# Patient Record
Sex: MALE | Race: OTHER | NOT HISPANIC OR LATINO
[De-identification: names, ages, dates, MRNs, and addresses within clinical notes are randomized per-mention and may not be internally consistent; named-entity substitution may affect disease eponyms.]

---

## 2018-03-19 ENCOUNTER — APPOINTMENT (OUTPATIENT)
Dept: PEDIATRIC ALLERGY IMMUNOLOGY | Facility: CLINIC | Age: 38
End: 2018-03-19

## 2019-05-01 ENCOUNTER — APPOINTMENT (OUTPATIENT)
Dept: FAMILY MEDICINE | Facility: CLINIC | Age: 39
End: 2019-05-01
Payer: COMMERCIAL

## 2019-05-01 VITALS
OXYGEN SATURATION: 94 % | RESPIRATION RATE: 16 BRPM | SYSTOLIC BLOOD PRESSURE: 126 MMHG | DIASTOLIC BLOOD PRESSURE: 74 MMHG | HEIGHT: 68 IN | HEART RATE: 66 BPM | WEIGHT: 240 LBS | BODY MASS INDEX: 36.37 KG/M2

## 2019-05-01 PROCEDURE — 99203 OFFICE O/P NEW LOW 30 MIN: CPT

## 2019-05-01 NOTE — HEALTH RISK ASSESSMENT
[] : No [No falls in past year] : Patient reported no falls in the past year [0] : 2) Feeling down, depressed, or hopeless: Not at all (0) [[id number]] : 0

## 2019-05-01 NOTE — ASSESSMENT
[FreeTextEntry1] : already taking flonase, singulair \par unsure of etiology of ear pain\par \par already had blood work done

## 2019-05-01 NOTE — PHYSICAL EXAM
[Well Nourished] : well nourished [Clear to Auscultation] : lungs were clear to auscultation bilaterally [Regular Rhythm] : with a regular rhythm [Normal S1, S2] : normal S1 and S2 [Normal Gait] : normal gait [Normal Insight/Judgement] : insight and judgment were intact [de-identified] : ears clear

## 2019-05-01 NOTE — HISTORY OF PRESENT ILLNESS
[FreeTextEntry8] : 39 year old male here to establish care and with complaints of left ear feeling clogged. Patients active medications, allergies and issues were all reviewed with the patient at time of visit.\par

## 2019-05-17 ENCOUNTER — APPOINTMENT (OUTPATIENT)
Dept: FAMILY MEDICINE | Facility: CLINIC | Age: 39
End: 2019-05-17
Payer: COMMERCIAL

## 2019-05-17 VITALS
BODY MASS INDEX: 36.37 KG/M2 | WEIGHT: 240 LBS | HEIGHT: 68 IN | DIASTOLIC BLOOD PRESSURE: 80 MMHG | SYSTOLIC BLOOD PRESSURE: 122 MMHG

## 2019-05-17 PROCEDURE — 36415 COLL VENOUS BLD VENIPUNCTURE: CPT

## 2019-05-17 PROCEDURE — 99395 PREV VISIT EST AGE 18-39: CPT | Mod: 25

## 2019-05-17 NOTE — HEALTH RISK ASSESSMENT
[Good] : ~his/her~ current health as good [No falls in past year] : Patient reported no falls in the past year [0] : 1) Little interest or pleasure doing things: Not at all (0) [None] : None [# Of Children ___] : has [unfilled] children [Fully functional (bathing, dressing, toileting, transferring, walking, feeding)] : Fully functional (bathing, dressing, toileting, transferring, walking, feeding) [Smoke Detector] : smoke detector [Fully functional (using the telephone, shopping, preparing meals, housekeeping, doing laundry, using] : Fully functional and needs no help or supervision to perform IADLs (using the telephone, shopping, preparing meals, housekeeping, doing laundry, using transportation, managing medications and managing finances) [Seat Belt] :  uses seat belt [[id number]] : 0 [] : No [de-identified] : fivelma

## 2019-05-17 NOTE — HISTORY OF PRESENT ILLNESS
[FreeTextEntry8] : 39 year old male  here for annual well visit. Patient's blood work was drawn and medications reviewed. Patient's past medical history was reviewed, allergies verified and problems were identified and assessed. Patients medications were reviewed. Patient is feeling well with no new or active complaints at this time.\par \par wants to discuss weight loss \par \par

## 2019-05-17 NOTE — PHYSICAL EXAM
[Clear to Auscultation] : lungs were clear to auscultation bilaterally [Well Nourished] : well nourished [Regular Rhythm] : with a regular rhythm [Normal Gait] : normal gait [Normal S1, S2] : normal S1 and S2 [Normal Insight/Judgement] : insight and judgment were intact

## 2019-05-17 NOTE — ASSESSMENT
[FreeTextEntry1] : Patient was counseled on healthy eating habits, on daily exercise and stress relief. All medications and allergies were reviewed with the patient and any adjustments necessary were made. Patient was counseled to try engage in an exercise activity for at least 30 minutes 3-4 times a week.  Patient was advised to eat a diet low in fat and carbohydrates and high in protein, with plenty of vegetables. Patient was advised to try and engage in relaxing activities whenever possible.\par The patients blood was draw in office and will be followed and assessed for any issues.  Patient was told to return to the office if any issues arise.  Unless otherwise stated, the patient is to continue all other medications as previously prescribed.\par \par try singulair\par \par The diagnosis of obesity was discussed with the patient. The patient was counseled on diet and exercise. Patient was advised to eat a diet low in carbohydrates and low in fat with high protein diet with plenty of vegetables. Patient was counseled to eat small meals throughout the day avoiding carbohydrates, foods high in fat, foods that are fried and fast food.  Patient was advised to monitor fluid intake, to drink at least 8 glasses of pure water a day and reduce/stop intake of all sugar drinks including juice and soda. Patient was advised to try and exercise for 30-40 minutes per day for at least three days a week. Pros and cons of using medical management for weight loss versus diet/exercise alone was discussed. Medication options were provided for the patient and side affects and risks were identified and discussed.  Patient was advised to take any medications as prescribed and to call office or go to the ER immediately if any issues with the medications occur. All questions were answered.\par will start phentermine

## 2019-06-18 ENCOUNTER — RX RENEWAL (OUTPATIENT)
Age: 39
End: 2019-06-18

## 2019-06-21 ENCOUNTER — APPOINTMENT (OUTPATIENT)
Dept: FAMILY MEDICINE | Facility: CLINIC | Age: 39
End: 2019-06-21
Payer: COMMERCIAL

## 2019-06-21 VITALS
HEART RATE: 69 BPM | WEIGHT: 234 LBS | RESPIRATION RATE: 16 BRPM | HEIGHT: 68 IN | BODY MASS INDEX: 35.46 KG/M2 | SYSTOLIC BLOOD PRESSURE: 112 MMHG | OXYGEN SATURATION: 96 % | DIASTOLIC BLOOD PRESSURE: 60 MMHG

## 2019-06-21 PROCEDURE — 99214 OFFICE O/P EST MOD 30 MIN: CPT

## 2019-06-21 NOTE — HISTORY OF PRESENT ILLNESS
[FreeTextEntry8] : 39 year old male is here for a followup visit for obesity. Patient is here for medication renewals and for blood work discussion. Medications and allergies were reviewed and assessed.  There has been no new medications since the last visit. Patient is feeling well with no active changes or issues since His last visit.\par \par \par \par

## 2019-06-21 NOTE — ASSESSMENT
[FreeTextEntry1] : OBESITY\par The diagnosis of obesity was discussed with the patient. The patient was counseled on diet and exercise. Patient was advised to eat a diet low in carbohydrates and low in fat with high protein diet with plenty of vegetables. Patient was counseled to eat small meals throughout the day avoiding carbohydrates, foods high in fat, foods that are fried and fast food.  Patient was advised to monitor fluid intake, to drink at least 8 glasses of pure water a day and reduce/stop intake of all sugar drinks including juice and soda. Patient was advised to try and exercise for 30-40 minutes per day for at least three days a week. Pros and cons of using medical management for weight loss versus diet/exercise alone was discussed. Medication options were provided for the patient and side affects and risks were identified and discussed.  Patient was advised to take any medications as prescribed and to call office or go to the ER immediately if any issues with the medications occur. All questions were answered.\par lost 6 pounds, feeling great\par bond, eating less, smaller portions\par \par ALLERGIES\par doing better overall with flonase and singulair\par continue

## 2019-06-21 NOTE — HEALTH RISK ASSESSMENT
[] : No [No falls in past year] : Patient reported no falls in the past year [0] : 2) Feeling down, depressed, or hopeless: Not at all (0) [[id number]] : 0 [Good] : ~his/her~  mood as  good [None] : None [# Of Children ___] : has [unfilled] children [Fully functional (bathing, dressing, toileting, transferring, walking, feeding)] : Fully functional (bathing, dressing, toileting, transferring, walking, feeding) [Fully functional (using the telephone, shopping, preparing meals, housekeeping, doing laundry, using] : Fully functional and needs no help or supervision to perform IADLs (using the telephone, shopping, preparing meals, housekeeping, doing laundry, using transportation, managing medications and managing finances) [Smoke Detector] : smoke detector [Seat Belt] :  uses seat belt [de-identified] : fivelma

## 2019-07-10 ENCOUNTER — APPOINTMENT (OUTPATIENT)
Dept: FAMILY MEDICINE | Facility: CLINIC | Age: 39
End: 2019-07-10
Payer: COMMERCIAL

## 2019-07-10 VITALS
RESPIRATION RATE: 16 BRPM | HEART RATE: 99 BPM | SYSTOLIC BLOOD PRESSURE: 140 MMHG | BODY MASS INDEX: 35.46 KG/M2 | OXYGEN SATURATION: 95 % | DIASTOLIC BLOOD PRESSURE: 70 MMHG | WEIGHT: 234 LBS | HEIGHT: 68 IN | TEMPERATURE: 101.7 F

## 2019-07-10 LAB
FLUAV SPEC QL CULT: POSITIVE
FLUBV AG SPEC QL IA: NEGATIVE

## 2019-07-10 PROCEDURE — 99213 OFFICE O/P EST LOW 20 MIN: CPT | Mod: 25

## 2019-07-10 PROCEDURE — 87804 INFLUENZA ASSAY W/OPTIC: CPT | Mod: QW

## 2019-07-10 RX ORDER — NAPROXEN 500 MG/1
500 TABLET ORAL
Qty: 20 | Refills: 0 | Status: COMPLETED | COMMUNITY
Start: 2019-03-25

## 2019-07-10 NOTE — PHYSICAL EXAM
[Well Nourished] : well nourished [Clear to Auscultation] : lungs were clear to auscultation bilaterally [Regular Rhythm] : with a regular rhythm [Normal Gait] : normal gait [Normal S1, S2] : normal S1 and S2 [Normal Insight/Judgement] : insight and judgment were intact [de-identified] : right ear red

## 2019-07-10 NOTE — HISTORY OF PRESENT ILLNESS
[FreeTextEntry8] : 39 year old male here with complaints of body aches, chills, fever, cough and congestion for one day. Patients active medications, allergies and issues were all reviewed with the patient at time of visit.\par \par \par \par

## 2019-07-10 NOTE — ASSESSMENT
[FreeTextEntry1] : Rapid flu test was administered for both Flu A and Flu B due to symptoms consistent with the flu.  Patient symptoms are due to a virus and is advised to rest, stay home, hydrate, avoid public places, use good handwashing hygiene.   May use tylenol/ibuprofen as necessary for symptoms.  If symptoms worsen or do not improve return to this office, seek care or go directly to the ER.\par Test results were: flu A test POSITIVE\par \par The symptoms that are occurring are most likely secondary to virus, therefore antibiotics are deferred at this time. Patient was told to rest, hydrate and treat symptoms as necessary. May use tylenol/ibuprofen as necessary for symptomatic relief.  If symptoms worsen or do not improve return to this office, seek care or go directly to the ER.\par TAMIFLU

## 2019-07-10 NOTE — HEALTH RISK ASSESSMENT
[No falls in past year] : Patient reported no falls in the past year [0] : 2) Feeling down, depressed, or hopeless: Not at all (0) [Good] : ~his/her~  mood as  good [None] : None [# Of Children ___] : has [unfilled] children [Fully functional (bathing, dressing, toileting, transferring, walking, feeding)] : Fully functional (bathing, dressing, toileting, transferring, walking, feeding) [Fully functional (using the telephone, shopping, preparing meals, housekeeping, doing laundry, using] : Fully functional and needs no help or supervision to perform IADLs (using the telephone, shopping, preparing meals, housekeeping, doing laundry, using transportation, managing medications and managing finances) [Smoke Detector] : smoke detector [Seat Belt] :  uses seat belt [] : No [[id number]] : 0 [de-identified] : fivelma

## 2019-07-15 ENCOUNTER — APPOINTMENT (OUTPATIENT)
Dept: FAMILY MEDICINE | Facility: CLINIC | Age: 39
End: 2019-07-15
Payer: COMMERCIAL

## 2019-07-15 VITALS — DIASTOLIC BLOOD PRESSURE: 88 MMHG | SYSTOLIC BLOOD PRESSURE: 130 MMHG

## 2019-07-15 PROCEDURE — 99213 OFFICE O/P EST LOW 20 MIN: CPT

## 2019-07-15 NOTE — HISTORY OF PRESENT ILLNESS
[FreeTextEntry8] : 39 year old male here for fu for flu, however not feeling well.  Patients active medications, allergies and issues were all reviewed with the patient at time of visit.\par \par \par \par

## 2019-07-15 NOTE — ASSESSMENT
[FreeTextEntry1] : Rapid flu test was administered for both Flu A and Flu B due to symptoms consistent with the flu.  Patient symptoms are due to a virus and is advised to rest, stay home, hydrate, avoid public places, use good handwashing hygiene.   May use tylenol/ibuprofen as necessary for symptoms.  If symptoms worsen or do not improve return to this office, seek care or go directly to the ER.\par Test results were: flu A test POSITIVE on 7/10/19\par \par now with rhonchi\par treat for pneumonia\par prednisone\par ventolin\par \par Patient was advised to take all medications as prescribed and to finish any antibiotics in their entirety. Patient was told to rest, hydrate and treat symptoms as necessary. Patient was advised to return to this office or go directly to the ER if symptoms do not improve or if any worsening occurs.\par

## 2019-07-15 NOTE — PHYSICAL EXAM
[Well Nourished] : well nourished [Regular Rhythm] : with a regular rhythm [Normal S1, S2] : normal S1 and S2 [Normal Gait] : normal gait [Normal Insight/Judgement] : insight and judgment were intact [de-identified] : right ear red [de-identified] : [place]

## 2019-07-15 NOTE — HEALTH RISK ASSESSMENT
[] : No [No falls in past year] : Patient reported no falls in the past year [0] : 2) Feeling down, depressed, or hopeless: Not at all (0) [[id number]] : 0 [Good] : ~his/her~  mood as  good [None] : None [# Of Children ___] : has [unfilled] children [Fully functional (bathing, dressing, toileting, transferring, walking, feeding)] : Fully functional (bathing, dressing, toileting, transferring, walking, feeding) [Fully functional (using the telephone, shopping, preparing meals, housekeeping, doing laundry, using] : Fully functional and needs no help or supervision to perform IADLs (using the telephone, shopping, preparing meals, housekeeping, doing laundry, using transportation, managing medications and managing finances) [Smoke Detector] : smoke detector [Seat Belt] :  uses seat belt [de-identified] : fivelma

## 2019-07-31 ENCOUNTER — APPOINTMENT (OUTPATIENT)
Dept: FAMILY MEDICINE | Facility: CLINIC | Age: 39
End: 2019-07-31

## 2019-08-12 ENCOUNTER — APPOINTMENT (OUTPATIENT)
Dept: FAMILY MEDICINE | Facility: CLINIC | Age: 39
End: 2019-08-12
Payer: COMMERCIAL

## 2019-08-12 VITALS
DIASTOLIC BLOOD PRESSURE: 82 MMHG | WEIGHT: 233 LBS | HEIGHT: 68 IN | SYSTOLIC BLOOD PRESSURE: 130 MMHG | BODY MASS INDEX: 35.31 KG/M2

## 2019-08-12 PROCEDURE — 99214 OFFICE O/P EST MOD 30 MIN: CPT

## 2019-08-12 RX ORDER — PROMETHAZINE HYDROCHLORIDE AND DEXTROMETHORPHAN HYDROBROMIDE ORAL SOLUTION 15; 6.25 MG/5ML; MG/5ML
6.25-15 SOLUTION ORAL
Qty: 150 | Refills: 0 | Status: DISCONTINUED | COMMUNITY
Start: 2019-07-10 | End: 2019-08-12

## 2019-08-12 RX ORDER — PREDNISONE 20 MG/1
20 TABLET ORAL
Qty: 5 | Refills: 0 | Status: DISCONTINUED | COMMUNITY
Start: 2019-07-15 | End: 2019-08-12

## 2019-08-12 RX ORDER — LEVOFLOXACIN 500 MG/1
500 TABLET, FILM COATED ORAL DAILY
Qty: 10 | Refills: 0 | Status: DISCONTINUED | COMMUNITY
Start: 2019-07-15 | End: 2019-08-12

## 2019-08-12 RX ORDER — IBUPROFEN 800 MG/1
800 TABLET, FILM COATED ORAL
Qty: 21 | Refills: 0 | Status: DISCONTINUED | COMMUNITY
Start: 2019-07-10 | End: 2019-08-12

## 2019-08-12 RX ORDER — OSELTAMIVIR PHOSPHATE 75 MG/1
75 CAPSULE ORAL TWICE DAILY
Qty: 10 | Refills: 0 | Status: DISCONTINUED | COMMUNITY
Start: 2019-07-10 | End: 2019-08-12

## 2019-08-12 NOTE — HEALTH RISK ASSESSMENT
[Yes] : Yes [] : No [No falls in past year] : Patient reported no falls in the past year [0] : 2) Feeling down, depressed, or hopeless: Not at all (0) [[id number]] : 0 [Good] : ~his/her~  mood as  good [# Of Children ___] : has [unfilled] children [None] : None [Fully functional (using the telephone, shopping, preparing meals, housekeeping, doing laundry, using] : Fully functional and needs no help or supervision to perform IADLs (using the telephone, shopping, preparing meals, housekeeping, doing laundry, using transportation, managing medications and managing finances) [Fully functional (bathing, dressing, toileting, transferring, walking, feeding)] : Fully functional (bathing, dressing, toileting, transferring, walking, feeding) [Smoke Detector] : smoke detector [Seat Belt] :  uses seat belt [de-identified] : fivelma

## 2019-08-12 NOTE — ASSESSMENT
[FreeTextEntry1] : OBESITY\par The diagnosis of obesity was discussed with the patient. The patient was counseled on diet and exercise. Patient was advised to eat a diet low in carbohydrates and low in fat with high protein diet with plenty of vegetables. Patient was counseled to eat small meals throughout the day avoiding carbohydrates, foods high in fat, foods that are fried and fast food.  Patient was advised to monitor fluid intake, to drink at least 8 glasses of pure water a day and reduce/stop intake of all sugar drinks including juice and soda. Patient was advised to try and exercise for 30-40 minutes per day for at least three days a week. Pros and cons of using medical management for weight loss versus diet/exercise alone was discussed. Medication options were provided for the patient and side affects and risks were identified and discussed.  Patient was advised to take any medications as prescribed and to call office or go to the ER immediately if any issues with the medications occur. All questions were answered.\par lost 7 pounds, feeling great\par bond, eating less, smaller portions\par \par ALLERGIES\par doing better overall with flonase and singulair\par continue\par fu with allergist\par \par + FLU/URI\par feeling better\par just return back to work

## 2019-09-01 ENCOUNTER — TRANSCRIPTION ENCOUNTER (OUTPATIENT)
Age: 39
End: 2019-09-01

## 2019-09-05 ENCOUNTER — APPOINTMENT (OUTPATIENT)
Dept: OTOLARYNGOLOGY | Facility: CLINIC | Age: 39
End: 2019-09-05
Payer: COMMERCIAL

## 2019-09-05 VITALS
BODY MASS INDEX: 34.86 KG/M2 | DIASTOLIC BLOOD PRESSURE: 85 MMHG | WEIGHT: 230 LBS | SYSTOLIC BLOOD PRESSURE: 128 MMHG | HEIGHT: 68 IN | HEART RATE: 61 BPM

## 2019-09-05 DIAGNOSIS — Z78.9 OTHER SPECIFIED HEALTH STATUS: ICD-10-CM

## 2019-09-05 PROCEDURE — 31231 NASAL ENDOSCOPY DX: CPT

## 2019-09-05 PROCEDURE — 99204 OFFICE O/P NEW MOD 45 MIN: CPT | Mod: 25

## 2019-09-05 PROCEDURE — 92567 TYMPANOMETRY: CPT

## 2019-09-05 PROCEDURE — 92557 COMPREHENSIVE HEARING TEST: CPT

## 2019-09-05 NOTE — REASON FOR VISIT
[Initial Evaluation] : an initial evaluation for [Other: _____] : [unfilled] [FreeTextEntry2] : Initial visit for recent episode of dizziness and syncopal type event on Saturday 8/31/19, feelings of lightheadedness and losing of peripheral vision.

## 2019-09-05 NOTE — REVIEW OF SYSTEMS
[As Noted in HPI] : as noted in HPI [Ear Pain] : ear pain [Ear Itch] : ear itch [Lightheadedness] : lightheadedness [Recurrent Sinus Infections] : recurrent sinus infections [Eyesight Problems] : eyesight problems [Throat Clearing] : throat clearing [Joint Pain] : joint pain [Negative] : Heme/Lymph [FreeTextEntry1] : headache

## 2019-09-05 NOTE — HISTORY OF PRESENT ILLNESS
[de-identified] : 39 year old male initial visit for recent episode of dizziness and syncopal type event on Saturday 8/31/19, feelings of lightheadedness, nausea and losing of peripheral vision.  Patient states went to urgent care and blood sugar was 67, blood pressure and EKG done, normal, prescribed meclizine for dizziness.  Reports similar episode many years ago, but has not had additional episode since most recent event.  Patient is a  for [employer], currently not working, pending appointment with Neurologist and Cardiologist.  Patient states no longer feeling dizziness, intermittent nausea and vision is normal.  States ears feeling clogged, intermittent otalgia, secondary to TMJ history on Left side.  Denies otorrhea, tinnitus, hearing loss and ear infections in the past year.

## 2019-09-05 NOTE — PHYSICAL EXAM
[Normal] : no rashes [de-identified] : No spontaneous or gaze evoked nystagmus.  Head impulse testing normal.  [place] hallpike and supine roll maneuver normal.  Romberg negative for increased sway with eyes closed compared to open.

## 2019-09-06 ENCOUNTER — APPOINTMENT (OUTPATIENT)
Dept: FAMILY MEDICINE | Facility: CLINIC | Age: 39
End: 2019-09-06
Payer: COMMERCIAL

## 2019-09-06 VITALS — DIASTOLIC BLOOD PRESSURE: 80 MMHG | SYSTOLIC BLOOD PRESSURE: 130 MMHG

## 2019-09-06 DIAGNOSIS — Z87.09 PERSONAL HISTORY OF OTHER DISEASES OF THE RESPIRATORY SYSTEM: ICD-10-CM

## 2019-09-06 DIAGNOSIS — H93.292 OTHER ABNORMAL AUDITORY PERCEPTIONS, LEFT EAR: ICD-10-CM

## 2019-09-06 DIAGNOSIS — H92.09 OTALGIA, UNSPECIFIED EAR: ICD-10-CM

## 2019-09-06 DIAGNOSIS — J06.9 ACUTE UPPER RESPIRATORY INFECTION, UNSPECIFIED: ICD-10-CM

## 2019-09-06 PROCEDURE — 99214 OFFICE O/P EST MOD 30 MIN: CPT | Mod: 25

## 2019-09-06 PROCEDURE — 36415 COLL VENOUS BLD VENIPUNCTURE: CPT

## 2019-09-06 NOTE — HISTORY OF PRESENT ILLNESS
[FreeTextEntry8] : c/o dizziness that comes and goes w/ nausea, per pt, vertigo may have been ruled out by ent?\par states hx of heart murmur as child, c/o palpitations, would like to see cardio\par hx of asthma\par VNG pending\par mri done 3-4 yrs ago, nml per pt

## 2019-09-09 ENCOUNTER — APPOINTMENT (OUTPATIENT)
Dept: FAMILY MEDICINE | Facility: CLINIC | Age: 39
End: 2019-09-09
Payer: COMMERCIAL

## 2019-09-09 VITALS
HEART RATE: 75 BPM | TEMPERATURE: 98.4 F | HEIGHT: 68 IN | BODY MASS INDEX: 34.86 KG/M2 | DIASTOLIC BLOOD PRESSURE: 80 MMHG | OXYGEN SATURATION: 97 % | WEIGHT: 230 LBS | SYSTOLIC BLOOD PRESSURE: 120 MMHG

## 2019-09-09 DIAGNOSIS — R11.0 NAUSEA: ICD-10-CM

## 2019-09-09 PROCEDURE — 99213 OFFICE O/P EST LOW 20 MIN: CPT

## 2019-09-09 NOTE — HEALTH RISK ASSESSMENT
[] : No [Yes] : Yes [No falls in past year] : Patient reported no falls in the past year [0] : 2) Feeling down, depressed, or hopeless: Not at all (0) [[id number]] : 0 [Good] : ~his/her~  mood as  good [None] : None [Fully functional (bathing, dressing, toileting, transferring, walking, feeding)] : Fully functional (bathing, dressing, toileting, transferring, walking, feeding) [# Of Children ___] : has [unfilled] children [Fully functional (using the telephone, shopping, preparing meals, housekeeping, doing laundry, using] : Fully functional and needs no help or supervision to perform IADLs (using the telephone, shopping, preparing meals, housekeeping, doing laundry, using transportation, managing medications and managing finances) [Smoke Detector] : smoke detector [Seat Belt] :  uses seat belt [de-identified] : fivelma

## 2019-09-09 NOTE — ASSESSMENT
[FreeTextEntry1] : ALLERGIES\par doing better overall with flonase and singulair\par continue\par fu with allergist\par \par DIZZINESS\par following with ENT\par going to see cardiologist and neurologist\par describes dizziness not with changes in position, but more nausea\par zofran for nausea\par \par due to dizziness will hold off on phentermine for now\par has not taken in 10 days

## 2019-09-09 NOTE — HISTORY OF PRESENT ILLNESS
[FreeTextEntry8] : 39 year old male is here for a followup visit for obesity and with episodes of dizziness\par \par went to urgent care, was told it was vertigo\par went to ENT, said it wasn't vertigo\par \par Patient is here for medication renewals and for blood work discussion. Medications and allergies were reviewed and assessed.  There has been no new medications since the last visit. Patient is feeling well with no active changes or issues since His last visit.\par \par \par \par

## 2019-09-09 NOTE — PHYSICAL EXAM
[Well Nourished] : well nourished [Clear to Auscultation] : lungs were clear to auscultation bilaterally [Regular Rhythm] : with a regular rhythm [Normal Insight/Judgement] : insight and judgment were intact [Normal Gait] : normal gait [Normal S1, S2] : normal S1 and S2

## 2019-09-12 ENCOUNTER — APPOINTMENT (OUTPATIENT)
Dept: CARDIOLOGY | Facility: CLINIC | Age: 39
End: 2019-09-12
Payer: COMMERCIAL

## 2019-09-12 VITALS — DIASTOLIC BLOOD PRESSURE: 78 MMHG | SYSTOLIC BLOOD PRESSURE: 114 MMHG

## 2019-09-12 VITALS
OXYGEN SATURATION: 96 % | BODY MASS INDEX: 35.31 KG/M2 | SYSTOLIC BLOOD PRESSURE: 136 MMHG | DIASTOLIC BLOOD PRESSURE: 91 MMHG | HEART RATE: 71 BPM | RESPIRATION RATE: 17 BRPM | TEMPERATURE: 98.6 F | HEIGHT: 68 IN | WEIGHT: 233 LBS

## 2019-09-12 PROCEDURE — 99204 OFFICE O/P NEW MOD 45 MIN: CPT

## 2019-09-12 NOTE — DISCUSSION/SUMMARY
[FreeTextEntry1] : 39M with vague episodes of nausea, lightheadedness, headaches. Occasional dyspnea.  Does not sound cardiac in origin. May be combination of phentermine (he is currently off), dehydration, hypoglycemia. \par \par -Will check echo, EST to rule out cardiac disease\par -Continue to encourage hydration, regular meals throughout the day\par -Diet, exercise, weight loss recommended\par \par RV 3 months

## 2019-09-12 NOTE — HISTORY OF PRESENT ILLNESS
[FreeTextEntry1] : 39M who presents after urgent care visit for nausea, dizziness, headaches. Had ECG done at urgent care, was recommended to see ENT for vertigo. ENT recommended cardiac work up. At the time he went to urgent care, he denied CP, SOB. Symptoms resolved mostly but returned a week later.  Had profound nausea and lightheadedness. Subsequently resolved. Notes these episodes have happened in the past, sporadically. Seems to be happening more frequently. This time happened while watering the lawn outside. Otherwise, no clear triggers. EMT noted a blood sugar of 67 when he was having the symptoms. \par \par Notes occasional palpitations. Notes occasional chest tightness when he develops seasonal allergies. Notes a history of heart murmur since birth and had childhood echoes. Of note had been on phentermine but stopped after this most recent episode.  Symptoms have pretty much resolved at this point. \par \par Nonsmoker. Denies family hx of CAD. Works for [employer]. \par \par ECG: SR with nonspecific IVCD, LAFB

## 2019-09-12 NOTE — REVIEW OF SYSTEMS
[Fever] : no fever [Shortness Of Breath] : no shortness of breath [Chills] : no chills [Chest Pain] : no chest pain [Palpitations] : palpitations [Cough] : no cough [Abdominal Pain] : no abdominal pain [Heartburn] : no heartburn [Dizziness] : dizziness

## 2019-09-12 NOTE — PHYSICAL EXAM
[General Appearance - Well Developed] : well developed [Normal Appearance] : normal appearance [Well Groomed] : well groomed [General Appearance - Well Nourished] : well nourished [No Deformities] : no deformities [Normal Conjunctiva] : the conjunctiva exhibited no abnormalities [General Appearance - In No Acute Distress] : no acute distress [Eyelids - No Xanthelasma] : the eyelids demonstrated no xanthelasmas [Normal Oral Mucosa] : normal oral mucosa [No Oral Pallor] : no oral pallor [No Oral Cyanosis] : no oral cyanosis [Normal Jugular Venous V Waves Present] : normal jugular venous V waves present [Normal Jugular Venous A Waves Present] : normal jugular venous A waves present [Heart Rate And Rhythm] : heart rate and rhythm were normal [No Jugular Venous Huber A Waves] : no jugular venous huber A waves [Heart Sounds] : normal S1 and S2 [Murmurs] : no murmurs present [Edema] : no peripheral edema present [Exaggerated Use Of Accessory Muscles For Inspiration] : no accessory muscle use [Respiration, Rhythm And Depth] : normal respiratory rhythm and effort [Auscultation Breath Sounds / Voice Sounds] : lungs were clear to auscultation bilaterally [Abdomen Soft] : soft [Abdomen Tenderness] : non-tender [Abdomen Mass (___ Cm)] : no abdominal mass palpated [Abnormal Walk] : normal gait [Gait - Sufficient For Exercise Testing] : the gait was sufficient for exercise testing [Nail Clubbing] : no clubbing of the fingernails [Cyanosis, Localized] : no localized cyanosis [Skin Color & Pigmentation] : normal skin color and pigmentation [Petechial Hemorrhages (___cm)] : no petechial hemorrhages [No Venous Stasis] : no venous stasis [] : no rash [No Skin Ulcers] : no skin ulcer [Skin Lesions] : no skin lesions [Oriented To Time, Place, And Person] : oriented to person, place, and time [No Xanthoma] : no  xanthoma was observed [Affect] : the affect was normal [Mood] : the mood was normal [No Anxiety] : not feeling anxious

## 2019-09-17 ENCOUNTER — APPOINTMENT (OUTPATIENT)
Dept: OTOLARYNGOLOGY | Facility: CLINIC | Age: 39
End: 2019-09-17
Payer: COMMERCIAL

## 2019-09-17 PROCEDURE — 92537 CALORIC VSTBLR TEST W/REC: CPT

## 2019-09-17 PROCEDURE — 92540 BASIC VESTIBULAR EVALUATION: CPT

## 2019-09-17 PROCEDURE — 92567 TYMPANOMETRY: CPT

## 2019-09-17 PROCEDURE — 92700A: CUSTOM

## 2019-09-17 PROCEDURE — 92547 SUPPLEMENTAL ELECTRICAL TEST: CPT

## 2019-09-24 ENCOUNTER — APPOINTMENT (OUTPATIENT)
Dept: FAMILY MEDICINE | Facility: CLINIC | Age: 39
End: 2019-09-24
Payer: COMMERCIAL

## 2019-09-24 VITALS
RESPIRATION RATE: 18 BRPM | DIASTOLIC BLOOD PRESSURE: 68 MMHG | HEIGHT: 68 IN | WEIGHT: 233 LBS | BODY MASS INDEX: 35.31 KG/M2 | SYSTOLIC BLOOD PRESSURE: 116 MMHG | HEART RATE: 69 BPM | OXYGEN SATURATION: 98 %

## 2019-09-24 PROCEDURE — 99214 OFFICE O/P EST MOD 30 MIN: CPT

## 2019-09-24 RX ORDER — ONDANSETRON 4 MG/1
4 TABLET ORAL
Qty: 10 | Refills: 0 | Status: DISCONTINUED | COMMUNITY
Start: 2019-09-09 | End: 2019-09-24

## 2019-09-24 NOTE — HEALTH RISK ASSESSMENT
[] : No [No falls in past year] : Patient reported no falls in the past year [Yes] : Yes [0] : 2) Feeling down, depressed, or hopeless: Not at all (0) [[id number]] : 0 [Good] : ~his/her~  mood as  good [# Of Children ___] : has [unfilled] children [None] : None [Fully functional (using the telephone, shopping, preparing meals, housekeeping, doing laundry, using] : Fully functional and needs no help or supervision to perform IADLs (using the telephone, shopping, preparing meals, housekeeping, doing laundry, using transportation, managing medications and managing finances) [Fully functional (bathing, dressing, toileting, transferring, walking, feeding)] : Fully functional (bathing, dressing, toileting, transferring, walking, feeding) [Smoke Detector] : smoke detector [Seat Belt] :  uses seat belt [de-identified] : fivelma

## 2019-09-24 NOTE — HISTORY OF PRESENT ILLNESS
[FreeTextEntry8] : 39 year old male here for fu, Patients active medications, allergies and issues were all reviewed with the patient at time of visit.\par and with episodes of dizziness\par \par went to urgent care, was told it was vertigo\par went to ENT, said it wasn't vertigo\par had testing there and at cardio\par \par \par \par \par \par

## 2019-09-24 NOTE — PHYSICAL EXAM
[Well Nourished] : well nourished [Clear to Auscultation] : lungs were clear to auscultation bilaterally [Normal S1, S2] : normal S1 and S2 [Regular Rhythm] : with a regular rhythm [Normal Insight/Judgement] : insight and judgment were intact [Normal Gait] : normal gait

## 2019-09-26 ENCOUNTER — APPOINTMENT (OUTPATIENT)
Dept: CARDIOLOGY | Facility: CLINIC | Age: 39
End: 2019-09-26
Payer: COMMERCIAL

## 2019-09-26 VITALS
SYSTOLIC BLOOD PRESSURE: 138 MMHG | BODY MASS INDEX: 35.73 KG/M2 | HEART RATE: 68 BPM | DIASTOLIC BLOOD PRESSURE: 83 MMHG | WEIGHT: 235 LBS

## 2019-09-26 PROCEDURE — 99213 OFFICE O/P EST LOW 20 MIN: CPT

## 2019-09-26 NOTE — PHYSICAL EXAM
[General Appearance - Well Developed] : well developed [Normal Appearance] : normal appearance [Well Groomed] : well groomed [General Appearance - Well Nourished] : well nourished [No Deformities] : no deformities [General Appearance - In No Acute Distress] : no acute distress [Normal Conjunctiva] : the conjunctiva exhibited no abnormalities [Eyelids - No Xanthelasma] : the eyelids demonstrated no xanthelasmas [Normal Oral Mucosa] : normal oral mucosa [No Oral Pallor] : no oral pallor [No Oral Cyanosis] : no oral cyanosis [Normal Jugular Venous A Waves Present] : normal jugular venous A waves present [No Jugular Venous Huber A Waves] : no jugular venous huber A waves [Normal Jugular Venous V Waves Present] : normal jugular venous V waves present [Exaggerated Use Of Accessory Muscles For Inspiration] : no accessory muscle use [Respiration, Rhythm And Depth] : normal respiratory rhythm and effort [Heart Rate And Rhythm] : heart rate and rhythm were normal [Auscultation Breath Sounds / Voice Sounds] : lungs were clear to auscultation bilaterally [Heart Sounds] : normal S1 and S2 [Murmurs] : no murmurs present [Abdomen Soft] : soft [Edema] : no peripheral edema present [Abdomen Mass (___ Cm)] : no abdominal mass palpated [Abdomen Tenderness] : non-tender [Gait - Sufficient For Exercise Testing] : the gait was sufficient for exercise testing [Abnormal Walk] : normal gait [Petechial Hemorrhages (___cm)] : no petechial hemorrhages [Cyanosis, Localized] : no localized cyanosis [Nail Clubbing] : no clubbing of the fingernails [Skin Color & Pigmentation] : normal skin color and pigmentation [] : no rash [Skin Lesions] : no skin lesions [No Venous Stasis] : no venous stasis [No Skin Ulcers] : no skin ulcer [No Xanthoma] : no  xanthoma was observed [Affect] : the affect was normal [Oriented To Time, Place, And Person] : oriented to person, place, and time [No Anxiety] : not feeling anxious [Mood] : the mood was normal

## 2019-09-26 NOTE — REVIEW OF SYSTEMS
[Palpitations] : palpitations [Dizziness] : dizziness [Chills] : no chills [Fever] : no fever [Chest Pain] : no chest pain [Shortness Of Breath] : no shortness of breath [Abdominal Pain] : no abdominal pain [Cough] : no cough [Heartburn] : no heartburn

## 2019-09-26 NOTE — HISTORY OF PRESENT ILLNESS
[FreeTextEntry1] : 39M who presents for follow up for palpitations. Had been seen earlier this month for dizziness, nausea.  Was thought to be vertigo, had recent VNG which was negative for vertigo.  Notes the other day felt his heart was skipping a beat several times throughout the day. No clear triggers, sporadic. Notes he is under stress and does not sleep enough. Drinks only decaf, occasional beer drinking. Notes R sided back pain radiating around to the front, also nonexertional, without clear triggers. He has not yet had his echo or stress test, it is scheduled for next week. \par \par Early Sept 2019, urgent care visit for nausea, dizziness, headaches. Had ECG done at urgent care, was recommended to see ENT for vertigo. ENT recommended cardiac work up. At the time he went to urgent care, he denied CP, SOB. Symptoms resolved mostly but returned a week later.  Had profound nausea and lightheadedness. Subsequently resolved. Notes these episodes have happened in the past, sporadically. Seems to be happening more frequently. This time happened while watering the lawn outside. Otherwise, no clear triggers. EMT noted a blood sugar of 67 when he was having the symptoms. Of note had been on phentermine but stopped after this most recent episode. \par \par Nonsmoker. Denies family hx of CAD. Works for [employer]. \par \par ECG: SR with nonspecific IVCD, LAFB\par Lipids 5/19: 184/44/119/105

## 2019-10-03 ENCOUNTER — APPOINTMENT (OUTPATIENT)
Dept: INTERNAL MEDICINE | Facility: CLINIC | Age: 39
End: 2019-10-03
Payer: COMMERCIAL

## 2019-10-03 ENCOUNTER — APPOINTMENT (OUTPATIENT)
Dept: CARDIOLOGY | Facility: CLINIC | Age: 39
End: 2019-10-03
Payer: COMMERCIAL

## 2019-10-03 PROCEDURE — 99214 OFFICE O/P EST MOD 30 MIN: CPT | Mod: 25

## 2019-10-03 PROCEDURE — 93306 TTE W/DOPPLER COMPLETE: CPT

## 2019-10-03 PROCEDURE — 93015 CV STRESS TEST SUPVJ I&R: CPT

## 2019-11-22 ENCOUNTER — APPOINTMENT (OUTPATIENT)
Dept: NEUROLOGY | Facility: CLINIC | Age: 39
End: 2019-11-22

## 2020-01-22 ENCOUNTER — TRANSCRIPTION ENCOUNTER (OUTPATIENT)
Age: 40
End: 2020-01-22

## 2020-03-11 ENCOUNTER — APPOINTMENT (OUTPATIENT)
Dept: FAMILY MEDICINE | Facility: CLINIC | Age: 40
End: 2020-03-11
Payer: COMMERCIAL

## 2020-03-11 VITALS
HEIGHT: 68 IN | BODY MASS INDEX: 37.28 KG/M2 | SYSTOLIC BLOOD PRESSURE: 120 MMHG | WEIGHT: 246 LBS | TEMPERATURE: 98.5 F | DIASTOLIC BLOOD PRESSURE: 90 MMHG | OXYGEN SATURATION: 96 % | HEART RATE: 70 BPM

## 2020-03-11 DIAGNOSIS — M54.9 DORSALGIA, UNSPECIFIED: ICD-10-CM

## 2020-03-11 DIAGNOSIS — M26.609 UNSPECIFIED TEMPOROMANDIBULAR JOINT DISORDER: ICD-10-CM

## 2020-03-11 PROCEDURE — 99214 OFFICE O/P EST MOD 30 MIN: CPT

## 2020-03-11 NOTE — PHYSICAL EXAM
[Normal Oropharynx] : the oropharynx was normal [Normal TMs] : both tympanic membranes were normal [No Edema] : there was no peripheral edema [No Extremity Clubbing/Cyanosis] : no extremity clubbing/cyanosis [Grossly Normal Strength/Tone] : grossly normal strength/tone [Normal] : affect was normal and insight and judgment were intact [de-identified] : No sinus tenderness [de-identified] : mid thoracic paraspinal tenderness to palpation [de-identified] : negative SLR [de-identified] : CN II-XII intact

## 2020-03-11 NOTE — PLAN
[FreeTextEntry1] : Allergy symptoms, will refill allergy medications. Stable exam. Most likely tension headache component. \par \par Advised to try trial of heat/warm compresses for muscle tenderness as needed.  Advised to cover compress, not place directly on skin and not to apply for more than 15 minutes at a time.  Patient expressed understanding.\par Orthopedic referral given for further eval. \par \par May try ibuprofen for discomfort.  Advised to take with food.  Precautions discussed. \par TMJ-recommending dental follow-up. \par \par Advised if any worsening symptoms to seek immediate medical re-evaluation.

## 2020-03-11 NOTE — REVIEW OF SYSTEMS
[Sore Throat] : no sore throat [Wheezing] : no wheezing [Back Pain] : back pain [Negative] : Neurological [FreeTextEntry4] : congested

## 2020-03-11 NOTE — HISTORY OF PRESENT ILLNESS
[FreeTextEntry8] : Here for evaluation of seasonal allergies and headaches on and off. \par \par Might have had fever over one week ago, some stomach upset which resolved.\par No blurry vision. \par Left ear bothering him. Seasonal allergies are starting to flare up.\par \par History of TMJ, does not wear mouth guard. \par No neurologist.  No headache or lightheadedness. \par \par Medications and allergies reviewed.\par \par \par \par

## 2020-03-20 ENCOUNTER — APPOINTMENT (OUTPATIENT)
Dept: ORTHOPEDIC SURGERY | Facility: CLINIC | Age: 40
End: 2020-03-20

## 2020-03-23 ENCOUNTER — APPOINTMENT (OUTPATIENT)
Dept: FAMILY MEDICINE | Facility: CLINIC | Age: 40
End: 2020-03-23
Payer: COMMERCIAL

## 2020-03-23 VITALS
SYSTOLIC BLOOD PRESSURE: 126 MMHG | DIASTOLIC BLOOD PRESSURE: 88 MMHG | HEART RATE: 68 BPM | OXYGEN SATURATION: 96 % | TEMPERATURE: 98.9 F | RESPIRATION RATE: 18 BRPM

## 2020-03-23 PROCEDURE — 99213 OFFICE O/P EST LOW 20 MIN: CPT | Mod: 25

## 2020-03-23 PROCEDURE — 96372 THER/PROPH/DIAG INJ SC/IM: CPT

## 2020-03-23 RX ORDER — METHYLPRED ACET/NACL,ISO-OS/PF 40 MG/ML
40 VIAL (ML) INJECTION
Qty: 1 | Refills: 0 | Status: COMPLETED | OUTPATIENT
Start: 2020-03-23

## 2020-03-23 RX ADMIN — METHYLPREDNISOLONE ACETATE MG/ML: 40 INJECTION, SUSPENSION INTRA-ARTICULAR; INTRALESIONAL; INTRAMUSCULAR; SOFT TISSUE at 00:00

## 2020-03-23 NOTE — ASSESSMENT
[FreeTextEntry1] : ALLERGIES\par doing better overall with flonase and singulair\par however due to the season, getting worse, gave shot of solumedrol\par \par Palpitations\par seeing cardio, awaiting echo and possible holter\par \par neuro eval next month\par \par due to dizziness will hold off on phentermine for now\par has not taken in 10 days

## 2020-03-23 NOTE — HEALTH RISK ASSESSMENT
[] : No [Yes] : Yes [No falls in past year] : Patient reported no falls in the past year [0] : 2) Feeling down, depressed, or hopeless: Not at all (0) [[id number]] : 0 [Good] : ~his/her~  mood as  good [None] : None [# Of Children ___] : has [unfilled] children [Fully functional (bathing, dressing, toileting, transferring, walking, feeding)] : Fully functional (bathing, dressing, toileting, transferring, walking, feeding) [Fully functional (using the telephone, shopping, preparing meals, housekeeping, doing laundry, using] : Fully functional and needs no help or supervision to perform IADLs (using the telephone, shopping, preparing meals, housekeeping, doing laundry, using transportation, managing medications and managing finances) [Smoke Detector] : smoke detector [Seat Belt] :  uses seat belt [de-identified] : fivelma

## 2020-03-23 NOTE — HISTORY OF PRESENT ILLNESS
[FreeTextEntry8] : 39 year old male here with complaints of his allergies bothering him and is scared of getting a virus. Patients active medications, allergies and issues were all reviewed with the patient at time of visit.\par \par went to urgent care, was told it was vertigo\par went to ENT, said it wasn't vertigo\par had testing there and at cardio\par \par \par \par \par \par

## 2020-03-26 ENCOUNTER — TRANSCRIPTION ENCOUNTER (OUTPATIENT)
Age: 40
End: 2020-03-26

## 2020-04-02 ENCOUNTER — APPOINTMENT (OUTPATIENT)
Dept: CARDIOLOGY | Facility: CLINIC | Age: 40
End: 2020-04-02
Payer: COMMERCIAL

## 2020-04-02 VITALS
HEART RATE: 60 BPM | DIASTOLIC BLOOD PRESSURE: 86 MMHG | HEIGHT: 68 IN | OXYGEN SATURATION: 96 % | SYSTOLIC BLOOD PRESSURE: 143 MMHG | BODY MASS INDEX: 37.28 KG/M2 | WEIGHT: 246 LBS

## 2020-04-02 DIAGNOSIS — R42 DIZZINESS AND GIDDINESS: ICD-10-CM

## 2020-04-02 PROCEDURE — 99213 OFFICE O/P EST LOW 20 MIN: CPT

## 2020-04-02 NOTE — REVIEW OF SYSTEMS
[Palpitations] : palpitations [Dizziness] : dizziness [Fever] : no fever [Chills] : no chills [Shortness Of Breath] : no shortness of breath [Chest Pain] : no chest pain [Cough] : no cough [Abdominal Pain] : no abdominal pain [Heartburn] : no heartburn

## 2020-04-02 NOTE — DISCUSSION/SUMMARY
[FreeTextEntry1] : 39M with continued palpitations, vague episodes of nausea, lightheadedness, headaches. \par \par His palpitations had resolved and recently started back up again. \par \par His symptoms are very much driven by stress, anxiety. He notes feeling very anxious lately. \par Off phentermine\par Does drink caffeinated tea\par Normal cardiac testing\par \par -Reassurance, education provided. Can consider 24 hour Holter if persists \par -Hydration\par -Stress/anxiety management. To discuss further therapies with PCP\par -Diet, exercise, weight loss recommended\par \par RV 6 months

## 2020-04-02 NOTE — HISTORY OF PRESENT ILLNESS
[FreeTextEntry1] : 39M who presents for follow up for palpitations, nausea, dizziness\par \par Had undergone Echo and stress, both within normal limits\par Notes that palpitations have worsened lately, no clear triggers. Had improved somewhat over the winter\par Occasional sharp chest pain that lasts for a second and then resolves. No SOB. Notes occasional allergies symptoms. \par Under a lot of stress lately due to Covid-19. Drink caffeinated tea.\par \par HISTORY:\par \par Early Sept 2019, urgent care visit for nausea, dizziness, headaches. Had ECG done at urgent care, was recommended to see ENT for vertigo. ENT recommended cardiac work up. At the time he went to urgent care, he denied CP, SOB. Symptoms resolved mostly but returned a week later.  Had profound nausea and lightheadedness. Subsequently resolved. Notes these episodes have happened in the past, sporadically. Of note had been on phentermine in the past but stopped.\par \par Nonsmoker. Denies family hx of CAD. Works for [employer]. \par \par ECG: SR with nonspecific IVCD, LAFB\par Lipids 5/19: 184/44/119/105\par EST: 9 minutes, blunted BP response with exercise\par TTE: Normal

## 2020-04-02 NOTE — PHYSICAL EXAM
[General Appearance - Well Developed] : well developed [Normal Appearance] : normal appearance [Well Groomed] : well groomed [General Appearance - Well Nourished] : well nourished [No Deformities] : no deformities [General Appearance - In No Acute Distress] : no acute distress [Normal Conjunctiva] : the conjunctiva exhibited no abnormalities [Eyelids - No Xanthelasma] : the eyelids demonstrated no xanthelasmas [Normal Oral Mucosa] : normal oral mucosa [No Oral Pallor] : no oral pallor [No Oral Cyanosis] : no oral cyanosis [Normal Jugular Venous A Waves Present] : normal jugular venous A waves present [Normal Jugular Venous V Waves Present] : normal jugular venous V waves present [No Jugular Venous Huber A Waves] : no jugular venous huber A waves [Respiration, Rhythm And Depth] : normal respiratory rhythm and effort [Exaggerated Use Of Accessory Muscles For Inspiration] : no accessory muscle use [Auscultation Breath Sounds / Voice Sounds] : lungs were clear to auscultation bilaterally [Heart Rate And Rhythm] : heart rate and rhythm were normal [Heart Sounds] : normal S1 and S2 [Murmurs] : no murmurs present [Edema] : no peripheral edema present [Abdomen Soft] : soft [Abdomen Tenderness] : non-tender [Abdomen Mass (___ Cm)] : no abdominal mass palpated [Abnormal Walk] : normal gait [Gait - Sufficient For Exercise Testing] : the gait was sufficient for exercise testing [Nail Clubbing] : no clubbing of the fingernails [Cyanosis, Localized] : no localized cyanosis [Petechial Hemorrhages (___cm)] : no petechial hemorrhages [Skin Color & Pigmentation] : normal skin color and pigmentation [] : no rash [No Venous Stasis] : no venous stasis [Skin Lesions] : no skin lesions [No Skin Ulcers] : no skin ulcer [No Xanthoma] : no  xanthoma was observed [Oriented To Time, Place, And Person] : oriented to person, place, and time [Affect] : the affect was normal [Mood] : the mood was normal [No Anxiety] : not feeling anxious

## 2020-04-06 ENCOUNTER — TRANSCRIPTION ENCOUNTER (OUTPATIENT)
Age: 40
End: 2020-04-06

## 2020-04-13 ENCOUNTER — APPOINTMENT (OUTPATIENT)
Dept: FAMILY MEDICINE | Facility: CLINIC | Age: 40
End: 2020-04-13
Payer: COMMERCIAL

## 2020-04-13 DIAGNOSIS — G47.00 INSOMNIA, UNSPECIFIED: ICD-10-CM

## 2020-04-13 PROCEDURE — 99214 OFFICE O/P EST MOD 30 MIN: CPT | Mod: 95

## 2020-04-13 NOTE — HISTORY OF PRESENT ILLNESS
[Home] : at home, [unfilled] , at the time of the visit. [Medical Office: ([facility])___] : at ~his/her~ medical office located in [place] [Patient] : the patient [Self] : self [FreeTextEntry8] : 39 year old male here with complaints of feeling like his asthma is getting worse. also here to discuss covied and wishes to discuss his anxiety\par Patients active medications, allergies and issues were all reviewed with the patient at time of visit.\par \par \par \par \par \par \par \par

## 2020-04-13 NOTE — PHYSICAL EXAM
[Well Nourished] : well nourished [Well Developed] : well developed [No JVD] : no jugular venous distention [No Respiratory Distress] : no respiratory distress  [Normal Affect] : the affect was normal [Normal Insight/Judgement] : insight and judgment were intact

## 2020-04-13 NOTE — ASSESSMENT
[FreeTextEntry1] : ALLERGIES\par doing better overall with flonase and singulair\par \par palpitations\par saw cardiology\par \par ASTHMA\par patient reports that his asthma is acting up and is concerned, using rescue inhaler more regular\par try symbicort and reassess\par \par ANXIETY\par Discussed diagnosis of anxiety and panic attacks with the patient and potential outcomes/side affects of treatment versus non treatment. Medications were assessed and described at length. Side affects and black box warning were discussed.  Patient was advised to continue psychotherapy or seek therapy if not currently attending. Patient was educated on addictive potential of controlled substances and was counseled to use only as needed and sparingly. Patient verbalized understanding of all the above.\par discussed at length, will start lexapro\par xanax as needed\par PRIOR TO MEDS: anxious many times a day, to the point of having physical symptoms, always worried

## 2020-04-13 NOTE — HEALTH RISK ASSESSMENT
[] : No [Yes] : Yes [No falls in past year] : Patient reported no falls in the past year [0] : 2) Feeling down, depressed, or hopeless: Not at all (0) [[id number]] : 0 [Good] : ~his/her~  mood as  good [None] : None [# Of Children ___] : has [unfilled] children [Fully functional (bathing, dressing, toileting, transferring, walking, feeding)] : Fully functional (bathing, dressing, toileting, transferring, walking, feeding) [Fully functional (using the telephone, shopping, preparing meals, housekeeping, doing laundry, using] : Fully functional and needs no help or supervision to perform IADLs (using the telephone, shopping, preparing meals, housekeeping, doing laundry, using transportation, managing medications and managing finances) [Smoke Detector] : smoke detector [Seat Belt] :  uses seat belt [de-identified] : fivelma

## 2020-05-08 ENCOUNTER — APPOINTMENT (OUTPATIENT)
Dept: ORTHOPEDIC SURGERY | Facility: CLINIC | Age: 40
End: 2020-05-08
Payer: COMMERCIAL

## 2020-05-08 DIAGNOSIS — M54.5 LOW BACK PAIN: ICD-10-CM

## 2020-05-08 PROCEDURE — 99204 OFFICE O/P NEW MOD 45 MIN: CPT | Mod: 95

## 2020-06-19 ENCOUNTER — APPOINTMENT (OUTPATIENT)
Dept: FAMILY MEDICINE | Facility: CLINIC | Age: 40
End: 2020-06-19
Payer: COMMERCIAL

## 2020-06-19 VITALS
HEIGHT: 68 IN | SYSTOLIC BLOOD PRESSURE: 120 MMHG | WEIGHT: 252 LBS | BODY MASS INDEX: 38.19 KG/M2 | DIASTOLIC BLOOD PRESSURE: 82 MMHG

## 2020-06-19 PROCEDURE — 99214 OFFICE O/P EST MOD 30 MIN: CPT

## 2020-06-19 NOTE — ASSESSMENT
[FreeTextEntry1] : ALLERGIES\par doing better overall with flonase and singulair\par \par palpitations\par saw cardiology\par getting better, however will follow up\par \par nodule\par on right leg - reassurance given\par will order sonogram for patient\par \par ASTHMA\par patient reports that his asthma is acting up and is concerned, using rescue inhaler more regular\par try symbicort and reassess\par \par ANXIETY\par Discussed diagnosis of anxiety and panic attacks with the patient and potential outcomes/side affects of treatment versus non treatment. Medications were assessed and described at length. Side affects and black box warning were discussed.  Patient was advised to continue psychotherapy or seek therapy if not currently attending. Patient was educated on addictive potential of controlled substances and was counseled to use only as needed and sparingly. Patient verbalized understanding of all the above.\par discussed at length, never started lexapro\par xanax as needed\par PRIOR TO MEDS: anxious many times a day, to the point of having physical symptoms, always worried

## 2020-06-19 NOTE — PHYSICAL EXAM
[Well Nourished] : well nourished [Well Developed] : well developed [Clear to Auscultation] : lungs were clear to auscultation bilaterally [Supple] : supple [Normal Insight/Judgement] : insight and judgment were intact [Normal Affect] : the affect was normal [No Accessory Muscle Use] : no accessory muscle use

## 2020-06-19 NOTE — REVIEW OF SYSTEMS
[Insomnia] : insomnia [Anxiety] : anxiety [Negative] : Heme/Lymph [FreeTextEntry6] : wheezing [de-identified] : nodule on right leg

## 2020-06-19 NOTE — HEALTH RISK ASSESSMENT
[] : No [Yes] : Yes [No falls in past year] : Patient reported no falls in the past year [[id number]] : 0 [0] : 2) Feeling down, depressed, or hopeless: Not at all (0) [Good] : ~his/her~ current health as good [None] : None [# Of Children ___] : has [unfilled] children [Fully functional (bathing, dressing, toileting, transferring, walking, feeding)] : Fully functional (bathing, dressing, toileting, transferring, walking, feeding) [Fully functional (using the telephone, shopping, preparing meals, housekeeping, doing laundry, using] : Fully functional and needs no help or supervision to perform IADLs (using the telephone, shopping, preparing meals, housekeeping, doing laundry, using transportation, managing medications and managing finances) [Smoke Detector] : smoke detector [Seat Belt] :  uses seat belt [de-identified] : fivelma

## 2020-07-10 ENCOUNTER — TRANSCRIPTION ENCOUNTER (OUTPATIENT)
Age: 40
End: 2020-07-10

## 2020-07-24 ENCOUNTER — APPOINTMENT (OUTPATIENT)
Dept: FAMILY MEDICINE | Facility: CLINIC | Age: 40
End: 2020-07-24
Payer: COMMERCIAL

## 2020-07-24 VITALS
SYSTOLIC BLOOD PRESSURE: 120 MMHG | WEIGHT: 250 LBS | BODY MASS INDEX: 37.89 KG/M2 | HEART RATE: 69 BPM | HEIGHT: 68 IN | DIASTOLIC BLOOD PRESSURE: 75 MMHG | OXYGEN SATURATION: 99 %

## 2020-07-24 PROCEDURE — 99395 PREV VISIT EST AGE 18-39: CPT | Mod: 25

## 2020-07-24 PROCEDURE — 36415 COLL VENOUS BLD VENIPUNCTURE: CPT

## 2020-07-24 RX ORDER — FLUTICASONE PROPIONATE 50 UG/1
50 SPRAY, METERED NASAL TWICE DAILY
Qty: 1 | Refills: 0 | Status: DISCONTINUED | COMMUNITY
End: 2020-07-24

## 2020-07-24 RX ORDER — ALPRAZOLAM 0.25 MG/1
0.25 TABLET ORAL
Qty: 15 | Refills: 0 | Status: DISCONTINUED | COMMUNITY
Start: 2020-04-13 | End: 2020-07-24

## 2020-07-24 RX ORDER — BUDESONIDE AND FORMOTEROL FUMARATE DIHYDRATE 160; 4.5 UG/1; UG/1
160-4.5 AEROSOL RESPIRATORY (INHALATION) TWICE DAILY
Qty: 1 | Refills: 3 | Status: DISCONTINUED | COMMUNITY
Start: 2020-04-13 | End: 2020-07-24

## 2020-07-24 RX ORDER — MELOXICAM 15 MG/1
15 TABLET ORAL
Qty: 30 | Refills: 1 | Status: DISCONTINUED | COMMUNITY
Start: 2020-05-08 | End: 2020-07-24

## 2020-07-24 NOTE — ASSESSMENT
[FreeTextEntry1] : ALLERGIES\par doing better overall with flonase and singulair\par \par gout\par check uric acid\par indocin\par \par palpitations\par saw cardiology\par \par ASTHMA\par patient reports that his asthma is acting up and is concerned, using rescue inhaler more regular\par try symbicort and reassess\par \par SLEEP ISSUES\par Will send pulmologist, feels he should have sleep study\par \par ANXIETY\par Discussed diagnosis of anxiety and panic attacks with the patient and potential outcomes/side affects of treatment versus non treatment. Medications were assessed and described at length. Side affects and black box warning were discussed.  Patient was advised to continue psychotherapy or seek therapy if not currently attending. Patient was educated on addictive potential of controlled substances and was counseled to use only as needed and sparingly. Patient verbalized understanding of all the above.\par discussed at length, will start lexapro\par xanax as needed\par PRIOR TO MEDS: anxious many times a day, to the point of having physical symptoms, always worried

## 2020-07-24 NOTE — HEALTH RISK ASSESSMENT
[Very Good] : ~his/her~ current health as very good [No falls in past year] : Patient reported no falls in the past year [] : No [Yes] : Yes [0] : 2) Feeling down, depressed, or hopeless: Not at all (0) [[id number]] : 0 [With Significant Other] : lives with significant other [None] : None [Employed] : employed [Fully functional (bathing, dressing, toileting, transferring, walking, feeding)] : Fully functional (bathing, dressing, toileting, transferring, walking, feeding) [] :  [# Of Children ___] : has [unfilled] children [Fully functional (using the telephone, shopping, preparing meals, housekeeping, doing laundry, using] : Fully functional and needs no help or supervision to perform IADLs (using the telephone, shopping, preparing meals, housekeeping, doing laundry, using transportation, managing medications and managing finances) [Smoke Detector] : smoke detector [Seat Belt] :  uses seat belt

## 2020-07-24 NOTE — PHYSICAL EXAM
[Well Developed] : well developed [Well Nourished] : well nourished [Regular Rhythm] : with a regular rhythm [No Accessory Muscle Use] : no accessory muscle use [Normal Affect] : the affect was normal [Normal S1, S2] : normal S1 and S2 [Normal Insight/Judgement] : insight and judgment were intact

## 2020-07-24 NOTE — HISTORY OF PRESENT ILLNESS
[de-identified] : 40 year old male  here for annual well visit. Patient's blood work was drawn and medications reviewed. Patient's past medical history was reviewed, allergies verified and problems were identified and assessed. Patients medications were reviewed. Patient is feeling well with no new or active complaints at this time.\par  [FreeTextEntry8] : \par \par \par \par \par \par \par \par

## 2020-07-28 ENCOUNTER — APPOINTMENT (OUTPATIENT)
Dept: ENDOCRINOLOGY | Facility: CLINIC | Age: 40
End: 2020-07-28
Payer: COMMERCIAL

## 2020-07-28 PROCEDURE — 97802 MEDICAL NUTRITION INDIV IN: CPT

## 2020-08-05 ENCOUNTER — APPOINTMENT (OUTPATIENT)
Dept: ENDOCRINOLOGY | Facility: CLINIC | Age: 40
End: 2020-08-05
Payer: COMMERCIAL

## 2020-08-05 PROCEDURE — 97803 MED NUTRITION INDIV SUBSEQ: CPT

## 2020-08-07 ENCOUNTER — APPOINTMENT (OUTPATIENT)
Dept: FAMILY MEDICINE | Facility: CLINIC | Age: 40
End: 2020-08-07
Payer: COMMERCIAL

## 2020-08-07 ENCOUNTER — TRANSCRIPTION ENCOUNTER (OUTPATIENT)
Age: 40
End: 2020-08-07

## 2020-08-07 PROCEDURE — 99213 OFFICE O/P EST LOW 20 MIN: CPT | Mod: 95

## 2020-08-07 NOTE — ASSESSMENT
[FreeTextEntry1] : first toe pain\par indomethacin\par xray\par if no improvement ortho\par must rto for bw for uric acid

## 2020-08-07 NOTE — PHYSICAL EXAM
[No Acute Distress] : no acute distress [Well Nourished] : well nourished [Well Developed] : well developed [No CVA Tenderness] : no CVA  tenderness [Well-Appearing] : well-appearing [Normal Affect] : the affect was normal [Normal Insight/Judgement] : insight and judgment were intact

## 2020-08-07 NOTE — HISTORY OF PRESENT ILLNESS
[Medical Office: ([facility])___] : at the medical office located in  [Home] : at home, [unfilled] , at the time of the visit. [FreeTextEntry8] : 40 year old male here with complaints of first toe pain with walking. Patients active medications, allergies and issues were all reviewed with the patient at time of visit.\par  [Verbal consent obtained from patient] : the patient, [unfilled]

## 2020-08-11 DIAGNOSIS — M77.30 CALCANEAL SPUR, UNSPECIFIED FOOT: ICD-10-CM

## 2020-08-26 ENCOUNTER — APPOINTMENT (OUTPATIENT)
Dept: FAMILY MEDICINE | Facility: CLINIC | Age: 40
End: 2020-08-26
Payer: COMMERCIAL

## 2020-08-26 VITALS — SYSTOLIC BLOOD PRESSURE: 112 MMHG | DIASTOLIC BLOOD PRESSURE: 80 MMHG

## 2020-08-26 DIAGNOSIS — R22.9 LOCALIZED SWELLING, MASS AND LUMP, UNSPECIFIED: ICD-10-CM

## 2020-08-26 DIAGNOSIS — M25.522 PAIN IN LEFT ELBOW: ICD-10-CM

## 2020-08-26 DIAGNOSIS — M79.676 PAIN IN UNSPECIFIED TOE(S): ICD-10-CM

## 2020-08-26 PROCEDURE — 99214 OFFICE O/P EST MOD 30 MIN: CPT | Mod: 25

## 2020-08-26 PROCEDURE — 36415 COLL VENOUS BLD VENIPUNCTURE: CPT

## 2020-08-26 NOTE — PHYSICAL EXAM
[No Acute Distress] : no acute distress [Well Nourished] : well nourished [Well Developed] : well developed [Normal Affect] : the affect was normal [Well-Appearing] : well-appearing [No CVA Tenderness] : no CVA  tenderness [Normal Insight/Judgement] : insight and judgment were intact [de-identified] : tenderness to inner left elbow, tenderness to left anterior shin

## 2020-08-26 NOTE — ASSESSMENT
[FreeTextEntry1] : first toe pain\par went to see podiatry, was given injection, feeling better\par today will take blood to check for uric acid levels for gout\par \par left elbow pain\par suspect sprain from lifting\par ordered xray\par ortho\par mobic\par \par left anterior shin pain/nodule\par unsure of etiology\par no palpable abnormality, xray\par derm\par if no improvement ortho\par must rto for bw for uric acid

## 2020-08-26 NOTE — HISTORY OF PRESENT ILLNESS
[de-identified] : 40 year old male is here for a followup visit. Patient is here for medication renewals and for blood work discussion. Medications and allergies were reviewed and assessed.  There has been no new medications since the last visit. Patient is feeling well with no active changes or issues since His last visit.\par here for fu for toe pain\par went to podiatrist, had injection, feeling better, unsure if its gout

## 2020-09-08 ENCOUNTER — APPOINTMENT (OUTPATIENT)
Dept: ORTHOPEDIC SURGERY | Facility: CLINIC | Age: 40
End: 2020-09-08

## 2020-09-14 ENCOUNTER — APPOINTMENT (OUTPATIENT)
Dept: ENDOCRINOLOGY | Facility: CLINIC | Age: 40
End: 2020-09-14

## 2020-09-14 ENCOUNTER — APPOINTMENT (OUTPATIENT)
Dept: FAMILY MEDICINE | Facility: CLINIC | Age: 40
End: 2020-09-14
Payer: COMMERCIAL

## 2020-09-14 VITALS
SYSTOLIC BLOOD PRESSURE: 128 MMHG | HEIGHT: 68 IN | DIASTOLIC BLOOD PRESSURE: 76 MMHG | OXYGEN SATURATION: 96 % | HEART RATE: 68 BPM

## 2020-09-14 PROCEDURE — 99213 OFFICE O/P EST LOW 20 MIN: CPT

## 2020-09-14 RX ORDER — INDOMETHACIN 25 MG/1
25 CAPSULE ORAL 3 TIMES DAILY
Qty: 15 | Refills: 0 | Status: DISCONTINUED | COMMUNITY
Start: 2020-07-24 | End: 2020-09-14

## 2020-09-14 RX ORDER — MELOXICAM 15 MG/1
15 TABLET ORAL
Qty: 30 | Refills: 2 | Status: DISCONTINUED | COMMUNITY
Start: 2020-08-26 | End: 2020-09-14

## 2020-09-14 NOTE — HISTORY OF PRESENT ILLNESS
[de-identified] : 40 year old male is here for a followup visit for his toe pain. \par went to podiatrist, had injection and xray\par

## 2020-09-14 NOTE — ASSESSMENT
[FreeTextEntry1] : first toe pain\par went to see podiatry, was given injection, felt better, but not reoccurring\par will increase allopurinol to 200\par prednisone\par seeing ortho tomorrow\par \par reviewed bw, uric acid 11

## 2020-09-14 NOTE — PHYSICAL EXAM
[No Acute Distress] : no acute distress [Well Developed] : well developed [Well Nourished] : well nourished [Well-Appearing] : well-appearing [Clear to Auscultation] : lungs were clear to auscultation bilaterally [Regular Rhythm] : with a regular rhythm [No CVA Tenderness] : no CVA  tenderness [Normal S1, S2] : normal S1 and S2 [Normal Insight/Judgement] : insight and judgment were intact [Normal Affect] : the affect was normal [de-identified] : first toe, left foot, swollen, tender

## 2020-09-15 ENCOUNTER — APPOINTMENT (OUTPATIENT)
Dept: ORTHOPEDIC SURGERY | Facility: CLINIC | Age: 40
End: 2020-09-15
Payer: COMMERCIAL

## 2020-09-15 VITALS
SYSTOLIC BLOOD PRESSURE: 132 MMHG | HEART RATE: 61 BPM | HEIGHT: 68 IN | DIASTOLIC BLOOD PRESSURE: 82 MMHG | WEIGHT: 243 LBS | BODY MASS INDEX: 36.83 KG/M2

## 2020-09-15 DIAGNOSIS — M79.662 PAIN IN LEFT LOWER LEG: ICD-10-CM

## 2020-09-15 PROCEDURE — 73590 X-RAY EXAM OF LOWER LEG: CPT | Mod: LT

## 2020-09-15 PROCEDURE — 73630 X-RAY EXAM OF FOOT: CPT | Mod: LT

## 2020-09-15 PROCEDURE — 73080 X-RAY EXAM OF ELBOW: CPT | Mod: LT

## 2020-09-15 PROCEDURE — 99214 OFFICE O/P EST MOD 30 MIN: CPT

## 2020-09-15 NOTE — DISCUSSION/SUMMARY
[de-identified] : The patient has left medial epicondylitis.  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  He has a bony mass of the left tibia with lucency in the cortex.  I have discussed the pathology, natural history and treatment options with him.  He has been started on a 5-day course of steroids by his primary care physician.  He is encouraged to fill that prescription and follow that up with naproxen.  Medication risks have been reviewed.  I would like to reevaluate him in 2 weeks.  He is referred for an MRI to evaluate the abnormality of his left leg.

## 2020-09-15 NOTE — REASON FOR VISIT
[Initial Visit] : an initial visit for [FreeTextEntry2] : left elbow, left leg and left great toe pain

## 2020-09-15 NOTE — PHYSICAL EXAM
[LE] : Sensory: Intact in bilateral lower extremities [DP] : dorsalis pedis 2+ and symmetric bilaterally [Knee] : patellar 2+ and symmetric bilaterally [PT] : posterior tibial 2+ and symmetric bilaterally [Rad] : radial 2+ and symmetric bilaterally [Normal] : Alert and in no acute distress [Acute Distress] : not in acute distress [Poor Appearance] : well-appearing [Obese] : not obese [de-identified] : The patient has no respiratory distress. Mood and affect are normal. The patient is alert and oriented to person, place and time.\par Examination of the cervical spine demonstrates no tenderness, no deformity and no muscle spasm. Cervical spine rotation is 60° to the right, 60° to the left, 75° of extension and 45° of flexion. Neurologic exam of the upper extremities reveals intact sensation to light touch. Motor function is 5 over 5 in all groups. Deep tendon reflexes are 2+ and equal at the biceps, triceps and brachioradialis.\par Examination of the left shoulder demonstrates no swelling, no deformity and no tenderness. The shoulder is stable. Drop arm test is negative. Pratt test is negative. Liftoff test is negative. Motor strength is 5 over 5 in all groups. Range of motion is full and identical to that of the right shoulder. Flexion is 160°, abduction 160°, external rotation 45° and internal rotation to the lower thoracic level.\par Examination of the left elbow demonstrates tenderness of the medial epicondyle.  There is increased medial left elbow pain with wrist flexion.  There is no instability of either elbow.  Range of motion 0 to 130 degrees bilaterally.  The skin is intact.  There is no lymphedema.\par There is no pain with motion of the hips.  There is no pain with motion of the knees.  Both knees are stable and nontender.  Quadriceps and hamstring function are intact.  Raymundo test is negative.  There is a bony prominence of the left leg distal to the tibial tubercle.  The calf is soft and nontender.  Both Achilles tendons are intact.  There is tenderness of the first metatarsal phalangeal joint of the left foot.  There is no erythema or drainage.  The skin is intact.  There is no lymphedema. [de-identified] : AP, lateral and oblique x-rays of the left elbow demonstrate no fracture, no dislocation and no bony abnormality.\par AP and lateral x-rays of the left leg demonstrate slight prominence of the anterior cortex distal to the tibial tubercle.  There is a lucency in the cortical region.  AP, lateral and oblique x-rays of the left foot demonstrate no fracture or dislocation.  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

## 2020-09-15 NOTE — HISTORY OF PRESENT ILLNESS
[de-identified] : 40 year old RHD male  for [facility] More presents today with pain in the left elbow, left shin and left great toe. He states that his left great toe is most painful. He was diagnosed with gout and was treated indomethacin and allopurinol with no improvement in pain. He was seen by podiatry one month ago and was treated with a cortisone injection which made the pain worse. He complains of pain and reddens at the first MTP joint. He has difficulty wearing his work boots and feels most comfortable in an open toed shoe.\par He complains of chronic medial sided left elbow pain which worsened a couple months ago when lifting weights. Pain worsens with elbow extension. His elbow cracks. He complains of a tender bump over the midportion of the tibia which he discovered a couple weeks ago when touching his leg. He does not recall injury or trauma.

## 2020-09-23 ENCOUNTER — OUTPATIENT (OUTPATIENT)
Dept: OUTPATIENT SERVICES | Facility: HOSPITAL | Age: 40
LOS: 1 days | End: 2020-09-23
Payer: COMMERCIAL

## 2020-09-23 ENCOUNTER — APPOINTMENT (OUTPATIENT)
Dept: MRI IMAGING | Facility: CLINIC | Age: 40
End: 2020-09-23

## 2020-09-23 ENCOUNTER — RESULT REVIEW (OUTPATIENT)
Age: 40
End: 2020-09-23

## 2020-09-23 DIAGNOSIS — M79.662 PAIN IN LEFT LOWER LEG: ICD-10-CM

## 2020-09-23 DIAGNOSIS — Z98.89 OTHER SPECIFIED POSTPROCEDURAL STATES: Chronic | ICD-10-CM

## 2020-09-23 PROCEDURE — A9585: CPT

## 2020-09-23 PROCEDURE — 73720 MRI LWR EXTREMITY W/O&W/DYE: CPT | Mod: 26,LT

## 2020-09-23 PROCEDURE — 73720 MRI LWR EXTREMITY W/O&W/DYE: CPT

## 2020-09-29 ENCOUNTER — APPOINTMENT (OUTPATIENT)
Dept: ORTHOPEDIC SURGERY | Facility: CLINIC | Age: 40
End: 2020-09-29
Payer: COMMERCIAL

## 2020-09-29 VITALS
SYSTOLIC BLOOD PRESSURE: 122 MMHG | BODY MASS INDEX: 36.37 KG/M2 | DIASTOLIC BLOOD PRESSURE: 72 MMHG | WEIGHT: 240 LBS | HEIGHT: 68 IN | HEART RATE: 67 BPM

## 2020-09-29 DIAGNOSIS — M77.02 MEDIAL EPICONDYLITIS, LEFT ELBOW: ICD-10-CM

## 2020-09-29 DIAGNOSIS — M79.672 PAIN IN LEFT FOOT: ICD-10-CM

## 2020-09-29 DIAGNOSIS — M79.605 PAIN IN LEFT LEG: ICD-10-CM

## 2020-09-29 PROCEDURE — 99213 OFFICE O/P EST LOW 20 MIN: CPT

## 2020-09-29 NOTE — PHYSICAL EXAM
[LE] : Sensory: Intact in bilateral lower extremities [Knee] : patellar 2+ and symmetric bilaterally [DP] : dorsalis pedis 2+ and symmetric bilaterally [PT] : posterior tibial 2+ and symmetric bilaterally [Rad] : radial 2+ and symmetric bilaterally [Normal] : Alert and in no acute distress [Poor Appearance] : well-appearing [Acute Distress] : not in acute distress [Obese] : not obese [de-identified] : The patient has no respiratory distress. Mood and affect are normal. The patient is alert and oriented to person, place and time.\par Examination of the cervical spine demonstrates no tenderness, no deformity and no muscle spasm. Cervical spine rotation is 60° to the right, 60° to the left, 75° of extension and 45° of flexion. Neurologic exam of the upper extremities reveals intact sensation to light touch. Motor function is 5 over 5 in all groups. Deep tendon reflexes are 2+ and equal at the biceps, triceps and brachioradialis.\par Examination of the left shoulder demonstrates no swelling, no deformity and no tenderness. The shoulder is stable. Drop arm test is negative. Hinsdale test is negative. Liftoff test is negative. Motor strength is 5 over 5 in all groups. Range of motion is full and identical to that of the right shoulder. Flexion is 160°, abduction 160°, external rotation 45° and internal rotation to the lower thoracic level.\par Examination of the left elbow demonstrates minimal tenderness of the medial epicondyle.  There is increased medial left elbow pain with wrist flexion.  There is no instability of either elbow.  Range of motion 0 to 130 degrees bilaterally.  The skin is intact.  There is no lymphedema.\par There is no pain with motion of the hips.  There is no pain with motion of the knees.  Both knees are stable and nontender.  Quadriceps and hamstring function are intact.  Raymundo test is negative.  There is a bony prominence of the left leg distal to the tibial tubercle.  The calf is soft and nontender.  Both Achilles tendons are intact.  There is tenderness of the first metatarsal phalangeal joint of the left foot.  There is no erythema or drainage.  The skin is intact.  There is no lymphedema.

## 2020-09-29 NOTE — DISCUSSION/SUMMARY
[de-identified] : The patient's left elbow, left leg and left foot are improving.  He has completed the steroid course.  He is given a prescription for naproxen which he will begin if pain returns.  He should bear weight in a comfortable shoe.  If symptomatic in 3 weeks he will return.

## 2020-09-29 NOTE — HISTORY OF PRESENT ILLNESS
[de-identified] : 40 year old RHD male  for Lewis Derik presents today for follow up of left elbow, left leg and left foot pain. He reports significant improvement in pain since he was last seen. He has completed a course of prednisone. He still has discomfort at the left medial elbow which is tolerable. In terms of his left foot his pain has diminished significantly. He states that he is finally able to wear closed toe shoes. He had an MRI of the left leg and would like to discuss results.

## 2021-02-08 ENCOUNTER — TRANSCRIPTION ENCOUNTER (OUTPATIENT)
Age: 41
End: 2021-02-08

## 2021-02-09 ENCOUNTER — TRANSCRIPTION ENCOUNTER (OUTPATIENT)
Age: 41
End: 2021-02-09

## 2021-02-15 ENCOUNTER — APPOINTMENT (OUTPATIENT)
Dept: FAMILY MEDICINE | Facility: CLINIC | Age: 41
End: 2021-02-15
Payer: COMMERCIAL

## 2021-02-15 PROCEDURE — 99213 OFFICE O/P EST LOW 20 MIN: CPT | Mod: 95

## 2021-02-15 NOTE — ASSESSMENT
[FreeTextEntry1] : Patient called with symptoms that can be associated with the COVID-19 virus tested positive. \par \par Patient was advised at this time, to use symptomatic treatment.  Use Tylenol for fever and body aches.  Hydrate throughout the day.  Use cough medicine as needed. \par Patient advised to self isolate, avoid contact with others, stay home for 10 days.   All concerns were addressed and pertinent questions answered. If symptoms progress and become unmanageable at home, patient was advised to call the office for reassessment or to go directly to the ER for full assessment.\par \par medrol\par symbicort\par chest xray\par \par reviewed bw, uric acid 11

## 2021-02-15 NOTE — HISTORY OF PRESENT ILLNESS
[Home] : at home, [unfilled] , at the time of the visit. [Medical Office: ([facility])___] : at the medical office located in  [Verbal consent obtained from patient] : the patient, [unfilled] [de-identified] : 40 year old male here with complaints of testing positive for covid on 2/9/2021. Patients active medications, allergies and issues were all reviewed with the patient at time of visit.\par \par

## 2021-02-15 NOTE — PHYSICAL EXAM
[Well Nourished] : well nourished [Well Developed] : well developed [Well-Appearing] : well-appearing [Clear to Auscultation] : lungs were clear to auscultation bilaterally [Normal S1, S2] : normal S1 and S2 [Regular Rhythm] : with a regular rhythm [No CVA Tenderness] : no CVA  tenderness [Normal Affect] : the affect was normal [Normal Insight/Judgement] : insight and judgment were intact [de-identified] : first toe, left foot, swollen, tender

## 2021-02-17 ENCOUNTER — OUTPATIENT (OUTPATIENT)
Dept: OUTPATIENT SERVICES | Facility: HOSPITAL | Age: 41
LOS: 1 days | End: 2021-02-17
Payer: COMMERCIAL

## 2021-02-17 ENCOUNTER — APPOINTMENT (OUTPATIENT)
Dept: RADIOLOGY | Facility: IMAGING CENTER | Age: 41
End: 2021-02-17
Payer: COMMERCIAL

## 2021-02-17 DIAGNOSIS — Z98.89 OTHER SPECIFIED POSTPROCEDURAL STATES: Chronic | ICD-10-CM

## 2021-02-17 DIAGNOSIS — Z00.8 ENCOUNTER FOR OTHER GENERAL EXAMINATION: ICD-10-CM

## 2021-02-17 DIAGNOSIS — U07.1 COVID-19: ICD-10-CM

## 2021-02-17 PROCEDURE — 71046 X-RAY EXAM CHEST 2 VIEWS: CPT | Mod: 26

## 2021-02-17 PROCEDURE — 71046 X-RAY EXAM CHEST 2 VIEWS: CPT

## 2021-02-19 ENCOUNTER — APPOINTMENT (OUTPATIENT)
Dept: FAMILY MEDICINE | Facility: CLINIC | Age: 41
End: 2021-02-19
Payer: COMMERCIAL

## 2021-02-19 VITALS
OXYGEN SATURATION: 97 % | WEIGHT: 246 LBS | SYSTOLIC BLOOD PRESSURE: 130 MMHG | HEART RATE: 58 BPM | DIASTOLIC BLOOD PRESSURE: 78 MMHG | TEMPERATURE: 98.1 F | BODY MASS INDEX: 37.4 KG/M2

## 2021-02-19 DIAGNOSIS — R05 COUGH: ICD-10-CM

## 2021-02-19 PROCEDURE — 99072 ADDL SUPL MATRL&STAF TM PHE: CPT

## 2021-02-19 PROCEDURE — 99213 OFFICE O/P EST LOW 20 MIN: CPT

## 2021-02-19 RX ORDER — PREDNISONE 50 MG/1
50 TABLET ORAL DAILY
Qty: 5 | Refills: 0 | Status: DISCONTINUED | COMMUNITY
Start: 2020-09-14 | End: 2021-02-19

## 2021-02-19 RX ORDER — METHYLPREDNISOLONE 4 MG/1
4 TABLET ORAL
Qty: 21 | Refills: 0 | Status: DISCONTINUED | COMMUNITY
Start: 2021-02-15 | End: 2021-02-19

## 2021-02-19 RX ORDER — AMOXICILLIN 500 MG/1
500 TABLET, FILM COATED ORAL
Qty: 21 | Refills: 0 | Status: COMPLETED | COMMUNITY
Start: 2020-11-30

## 2021-02-19 RX ORDER — NAPROXEN 500 MG/1
500 TABLET ORAL TWICE DAILY
Qty: 60 | Refills: 0 | Status: DISCONTINUED | COMMUNITY
Start: 2020-09-15 | End: 2021-02-19

## 2021-02-19 RX ORDER — NAPROXEN 500 MG/1
500 TABLET ORAL
Qty: 60 | Refills: 0 | Status: DISCONTINUED | COMMUNITY
Start: 2020-09-29 | End: 2021-02-19

## 2021-02-19 RX ORDER — IBUPROFEN 600 MG/1
600 TABLET, FILM COATED ORAL
Qty: 28 | Refills: 0 | Status: COMPLETED | COMMUNITY
Start: 2020-11-30

## 2021-02-19 NOTE — HISTORY OF PRESENT ILLNESS
[de-identified] : 40 year old male here with complaints of testing positive for covid on 2/9/2021.still having some shortness of breath. Patients active medications, allergies and issues were all reviewed with the patient at time of visit.\par \par

## 2021-02-19 NOTE — PHYSICAL EXAM
[Well Nourished] : well nourished [Well Developed] : well developed [Well-Appearing] : well-appearing [Clear to Auscultation] : lungs were clear to auscultation bilaterally [Regular Rhythm] : with a regular rhythm [Normal S1, S2] : normal S1 and S2 [No CVA Tenderness] : no CVA  tenderness [Normal Insight/Judgement] : insight and judgment were intact [Normal Affect] : the affect was normal [de-identified] : first toe, left foot, swollen, tender

## 2021-02-19 NOTE — ASSESSMENT
[FreeTextEntry1] : Patient called with symptoms that can be associated with the COVID-19 virus tested positive. \par \par Patient was advised at this time, to use symptomatic treatment.  Use Tylenol for fever and body aches.  Hydrate throughout the day.  Use cough medicine as needed. \par Patient advised to self isolate, avoid contact with others, stay home for 10 days.   All concerns were addressed and pertinent questions answered. If symptoms progress and become unmanageable at home, patient was advised to call the office for reassessment or to go directly to the ER for full assessment.\par \par started symbicort, prednisone, no improvement\par chest xray reviewed with patient\par \par not ready to return to work\par \par reviewed bw, uric acid 11

## 2021-02-26 ENCOUNTER — APPOINTMENT (OUTPATIENT)
Dept: FAMILY MEDICINE | Facility: CLINIC | Age: 41
End: 2021-02-26
Payer: COMMERCIAL

## 2021-02-26 VITALS
OXYGEN SATURATION: 97 % | HEART RATE: 83 BPM | RESPIRATION RATE: 16 BRPM | SYSTOLIC BLOOD PRESSURE: 122 MMHG | DIASTOLIC BLOOD PRESSURE: 86 MMHG | TEMPERATURE: 98.7 F

## 2021-02-26 PROCEDURE — 99072 ADDL SUPL MATRL&STAF TM PHE: CPT

## 2021-02-26 PROCEDURE — 99213 OFFICE O/P EST LOW 20 MIN: CPT

## 2021-02-26 NOTE — ASSESSMENT
[FreeTextEntry1] : Patient called with symptoms that can be associated with the COVID-19 virus tested positive. \par \par chest xray reviewed with patient\par \par not ready to return to work\par continue symbicort\par continue albuterol\par \par reviewed bw, uric acid 11

## 2021-02-26 NOTE — PHYSICAL EXAM
[Well Nourished] : well nourished [Well Developed] : well developed [Well-Appearing] : well-appearing [Clear to Auscultation] : lungs were clear to auscultation bilaterally [Regular Rhythm] : with a regular rhythm [Normal S1, S2] : normal S1 and S2 [No CVA Tenderness] : no CVA  tenderness [Normal Affect] : the affect was normal [Normal Insight/Judgement] : insight and judgment were intact [de-identified] : first toe, left foot, swollen, tender

## 2021-03-08 ENCOUNTER — APPOINTMENT (OUTPATIENT)
Dept: FAMILY MEDICINE | Facility: CLINIC | Age: 41
End: 2021-03-08
Payer: COMMERCIAL

## 2021-03-08 ENCOUNTER — NON-APPOINTMENT (OUTPATIENT)
Age: 41
End: 2021-03-08

## 2021-03-08 VITALS
OXYGEN SATURATION: 95 % | DIASTOLIC BLOOD PRESSURE: 85 MMHG | SYSTOLIC BLOOD PRESSURE: 125 MMHG | HEART RATE: 87 BPM | BODY MASS INDEX: 37.28 KG/M2 | HEIGHT: 68 IN | WEIGHT: 246 LBS | TEMPERATURE: 98.5 F

## 2021-03-08 PROCEDURE — 99072 ADDL SUPL MATRL&STAF TM PHE: CPT

## 2021-03-08 PROCEDURE — 99213 OFFICE O/P EST LOW 20 MIN: CPT

## 2021-03-08 RX ORDER — ALLOPURINOL 100 MG/1
100 TABLET ORAL
Qty: 180 | Refills: 1 | Status: DISCONTINUED | COMMUNITY
Start: 2020-08-28 | End: 2021-03-08

## 2021-03-08 RX ORDER — ESCITALOPRAM OXALATE 10 MG/1
10 TABLET ORAL DAILY
Qty: 90 | Refills: 1 | Status: DISCONTINUED | COMMUNITY
Start: 2020-04-13 | End: 2021-03-08

## 2021-03-08 NOTE — ASSESSMENT
[FreeTextEntry1] : Patient called with symptoms that can be associated with the COVID-19 virus tested positive. \par \par chest xray reviewed with patient - negative\par \par not ready to return to work\par continue symbicort\par continue albuterol\par singulair\par medrol\par pulm consult\par

## 2021-03-08 NOTE — PHYSICAL EXAM
[Well Nourished] : well nourished [Well Developed] : well developed [Well-Appearing] : well-appearing [Clear to Auscultation] : lungs were clear to auscultation bilaterally [Regular Rhythm] : with a regular rhythm [Normal S1, S2] : normal S1 and S2 [No CVA Tenderness] : no CVA  tenderness [Normal Affect] : the affect was normal [Normal Insight/Judgement] : insight and judgment were intact [de-identified] : first toe, left foot, swollen, tender

## 2021-03-08 NOTE — HISTORY OF PRESENT ILLNESS
[de-identified] : 40 year old male here with complaints of testing positive for covid on 2/9/2021.still having some shortness of breath. Patients active medications, allergies and issues were all reviewed with the patient at time of visit.\par \par

## 2021-03-09 ENCOUNTER — APPOINTMENT (OUTPATIENT)
Dept: PULMONOLOGY | Facility: CLINIC | Age: 41
End: 2021-03-09
Payer: COMMERCIAL

## 2021-03-09 VITALS
TEMPERATURE: 97.2 F | OXYGEN SATURATION: 95 % | WEIGHT: 244 LBS | SYSTOLIC BLOOD PRESSURE: 107 MMHG | BODY MASS INDEX: 37.1 KG/M2 | DIASTOLIC BLOOD PRESSURE: 73 MMHG | RESPIRATION RATE: 16 BRPM | HEART RATE: 98 BPM

## 2021-03-09 PROCEDURE — 99072 ADDL SUPL MATRL&STAF TM PHE: CPT

## 2021-03-09 PROCEDURE — 71046 X-RAY EXAM CHEST 2 VIEWS: CPT

## 2021-03-09 PROCEDURE — 99204 OFFICE O/P NEW MOD 45 MIN: CPT | Mod: 25

## 2021-03-09 PROCEDURE — 36415 COLL VENOUS BLD VENIPUNCTURE: CPT

## 2021-03-10 LAB — DEPRECATED D DIMER PPP IA-ACNC: <150 NG/ML DDU

## 2021-03-10 NOTE — HISTORY OF PRESENT ILLNESS
[Never] : never [TextBox_4] : BASILIO CARVAJAL is a 40 year old  M referred for pulmonary evaluation for Covid.\par \par Tested positive 2/8 for COVID. Developed low grade fever. No cough or rhinitis for first week.\par 1 week later had chest pain forced him to cough. Tx 6 days medrol and Symbicort. Ventolin PRN\par Persistent SOB. MILES 1 block.\par Presently minimal cough. Still some SOB. \par Intermittent mild pain with deep breath. \par Denies GERD.\par \par Past pulmonary history. Allergies and mild asthma.\par Occupational Exposure. N\par Family history of pulmonary disease. N\par Recent travel N\par Pets Dog ? allergic.\par

## 2021-03-10 NOTE — PHYSICAL EXAM
[Supple] : supple [No JVD] : no jvd [Normal S1, S2] : normal s1, s2 [No Murmurs] : no murmurs [Clear to Auscultation Bilaterally] : clear to auscultation bilaterally [Normal to Percussion] : normal to percussion [Benign] : benign [Not Tender] : not tender [No HSM] : no hsm [No Clubbing] : no clubbing [No Cyanosis] : no cyanosis [No Edema] : no edema [TextBox_2] : Overweight

## 2021-03-10 NOTE — DISCUSSION/SUMMARY
[FreeTextEntry1] : S/P COVID\par Exertional dyspnea most likely related to global effects of Covid.  No definitive evidence of Covid pneumonia. \par Should rule out embolic disease although this is not highly likely based on clinical scenario.\par

## 2021-03-10 NOTE — ASSESSMENT
[FreeTextEntry1] : D-dimer was sent.  If negative would not pursue further testing for clot.\par For pulmonary function testing.\par Increase exercise as tolerated\par Would benefit from weight loss..

## 2021-03-11 NOTE — DISCUSSION/SUMMARY
[FreeTextEntry1] : 39M with continued palpitations, vague episodes of nausea, lightheadedness, headaches. Occasional dyspnea.  Pt is admittedly under a lot of stress and notes that he feels anxious at times. \par \par Does not sound cardiac in origin. May be combination of phentermine (he is currently off), dehydration, hypoglycemia, anxiety \par \par -Echo and EST planned for next week\par -Continue to encourage hydration, regular meals throughout the day\par -Diet, exercise, weight loss recommended\par \par Will evaluate after stress test 
operating room

## 2021-03-16 ENCOUNTER — APPOINTMENT (OUTPATIENT)
Dept: FAMILY MEDICINE | Facility: CLINIC | Age: 41
End: 2021-03-16
Payer: COMMERCIAL

## 2021-03-16 VITALS
OXYGEN SATURATION: 97 % | BODY MASS INDEX: 37.74 KG/M2 | SYSTOLIC BLOOD PRESSURE: 126 MMHG | DIASTOLIC BLOOD PRESSURE: 80 MMHG | WEIGHT: 249 LBS | HEART RATE: 70 BPM | HEIGHT: 68 IN | TEMPERATURE: 98.2 F

## 2021-03-16 DIAGNOSIS — U07.1 COVID-19: ICD-10-CM

## 2021-03-16 DIAGNOSIS — R06.02 SHORTNESS OF BREATH: ICD-10-CM

## 2021-03-16 PROCEDURE — 99213 OFFICE O/P EST LOW 20 MIN: CPT

## 2021-03-16 PROCEDURE — 99072 ADDL SUPL MATRL&STAF TM PHE: CPT

## 2021-03-16 NOTE — ASSESSMENT
[FreeTextEntry1] : Patient called with symptoms that can be associated with the COVID-19 virus tested positive. \par \par chest xray reviewed with patient - negative\par \par not ready to return to work\par continue symbicort\par continue albuterol\par singulair\par medrol\par pulm consult\par \par saw pulm, read and reviewed consult with pulm - d dimer negative, feeling better\par  may return to work 3/21/21\par

## 2021-03-16 NOTE — HISTORY OF PRESENT ILLNESS
[de-identified] : 40 year old male here with complaints of testing positive for covid on 2/9/2021.still having some shortness of breath. Patients active medications, allergies and issues were all reviewed with the patient at time of visit.\par \par

## 2021-03-16 NOTE — PHYSICAL EXAM
[Well Nourished] : well nourished [Well Developed] : well developed [Well-Appearing] : well-appearing [Clear to Auscultation] : lungs were clear to auscultation bilaterally [Regular Rhythm] : with a regular rhythm [Normal S1, S2] : normal S1 and S2 [No CVA Tenderness] : no CVA  tenderness [Normal Affect] : the affect was normal [Normal Insight/Judgement] : insight and judgment were intact [de-identified] : first toe, left foot, swollen, tender

## 2021-04-11 ENCOUNTER — RESULT CHARGE (OUTPATIENT)
Age: 41
End: 2021-04-11

## 2021-04-12 DIAGNOSIS — Z20.822 CONTACT WITH AND (SUSPECTED) EXPOSURE TO COVID-19: ICD-10-CM

## 2021-04-20 ENCOUNTER — APPOINTMENT (OUTPATIENT)
Dept: FAMILY MEDICINE | Facility: CLINIC | Age: 41
End: 2021-04-20
Payer: COMMERCIAL

## 2021-04-20 VITALS
DIASTOLIC BLOOD PRESSURE: 86 MMHG | TEMPERATURE: 98.1 F | BODY MASS INDEX: 36.98 KG/M2 | HEIGHT: 68 IN | WEIGHT: 244 LBS | OXYGEN SATURATION: 97 % | SYSTOLIC BLOOD PRESSURE: 128 MMHG | HEART RATE: 72 BPM

## 2021-04-20 PROCEDURE — 99213 OFFICE O/P EST LOW 20 MIN: CPT

## 2021-04-20 PROCEDURE — 99072 ADDL SUPL MATRL&STAF TM PHE: CPT

## 2021-04-20 NOTE — HISTORY OF PRESENT ILLNESS
[de-identified] : 41 year old male here with complaints of testing positive for covid on 2/9/2021.still having some shortness of breath. Patients active medications, allergies and issues were all reviewed with the patient at time of visit.\par \par

## 2021-04-20 NOTE — PHYSICAL EXAM
[Well Nourished] : well nourished [Well Developed] : well developed [Well-Appearing] : well-appearing [Clear to Auscultation] : lungs were clear to auscultation bilaterally [Regular Rhythm] : with a regular rhythm [Normal S1, S2] : normal S1 and S2 [No CVA Tenderness] : no CVA  tenderness [Normal Affect] : the affect was normal [Normal Insight/Judgement] : insight and judgment were intact [de-identified] : first toe, left foot, swollen, tender

## 2021-04-20 NOTE — ASSESSMENT
[FreeTextEntry1] : Patient called with symptoms that can be associated with the COVID-19 virus tested positive. \par \par chest xray reviewed with patient - negative\par \par finally feeling better\par may return to work with no restrctions\par \par saw pulm, read and reviewed consult with pulm - d dimer negative, feeling better\par \par

## 2021-05-27 ENCOUNTER — APPOINTMENT (OUTPATIENT)
Dept: FAMILY MEDICINE | Facility: CLINIC | Age: 41
End: 2021-05-27
Payer: COMMERCIAL

## 2021-05-27 DIAGNOSIS — T50.Z95A ADVERSE EFFECT OF OTHER VACCINES AND BIOLOGICAL SUBSTANCES, INITIAL ENCOUNTER: ICD-10-CM

## 2021-05-27 PROCEDURE — 99212 OFFICE O/P EST SF 10 MIN: CPT | Mod: 95

## 2021-05-27 NOTE — REVIEW OF SYSTEMS
[Fever] : fever [Chills] : chills [Fatigue] : fatigue [Hot Flashes] : no hot flashes [Night Sweats] : night sweats [Recent Change In Weight] : ~T no recent weight change [Abdominal Pain] : no abdominal pain [Nausea] : nausea [Constipation] : no constipation [Diarrhea] : no diarrhea [Vomiting] : no vomiting [Heartburn] : no heartburn [Melena] : no melena [Joint Pain] : no joint pain [Joint Stiffness] : no joint stiffness [Muscle Pain] : muscle pain [Muscle Weakness] : no muscle weakness [Back Pain] : no back pain [Joint Swelling] : no joint swelling [Negative] : Psychiatric

## 2021-05-27 NOTE — ASSESSMENT
[FreeTextEntry1] : explained to patient that this is a normal immune response to the vaccine, increase fluids, tylenol/advil as needed

## 2021-05-27 NOTE — HISTORY OF PRESENT ILLNESS
[Home] : at home, [unfilled] , at the time of the visit. [Medical Office: ([facility])___] : at the medical office located in  [Verbal consent obtained from patient] : the patient, [unfilled] [FreeTextEntry8] : Patient states that he received his first visor vaccine yesterday, and felt awful last night.  Temp went up to 101, patient had body aches chills and nausea.  Patient feels better today however patient is concerned this was a bad reaction.  Patient had Covid in January but only had respiratory symptoms did not have fever chills and aches.

## 2021-06-11 ENCOUNTER — APPOINTMENT (OUTPATIENT)
Dept: PULMONOLOGY | Facility: CLINIC | Age: 41
End: 2021-06-11

## 2021-07-10 ENCOUNTER — RX RENEWAL (OUTPATIENT)
Age: 41
End: 2021-07-10

## 2021-07-27 ENCOUNTER — APPOINTMENT (OUTPATIENT)
Dept: FAMILY MEDICINE | Facility: CLINIC | Age: 41
End: 2021-07-27
Payer: COMMERCIAL

## 2021-07-27 VITALS
SYSTOLIC BLOOD PRESSURE: 120 MMHG | HEART RATE: 66 BPM | WEIGHT: 244 LBS | OXYGEN SATURATION: 95 % | DIASTOLIC BLOOD PRESSURE: 82 MMHG | BODY MASS INDEX: 36.98 KG/M2 | HEIGHT: 68 IN | TEMPERATURE: 98.3 F

## 2021-07-27 DIAGNOSIS — Z00.00 ENCOUNTER FOR GENERAL ADULT MEDICAL EXAMINATION W/OUT ABNORMAL FINDINGS: ICD-10-CM

## 2021-07-27 PROCEDURE — 99395 PREV VISIT EST AGE 18-39: CPT | Mod: 25

## 2021-07-27 PROCEDURE — 36415 COLL VENOUS BLD VENIPUNCTURE: CPT

## 2021-07-27 RX ORDER — METHYLPREDNISOLONE 4 MG/1
4 TABLET ORAL
Qty: 1 | Refills: 0 | Status: DISCONTINUED | COMMUNITY
Start: 2021-03-08 | End: 2021-07-27

## 2021-07-27 RX ORDER — BUDESONIDE AND FORMOTEROL FUMARATE DIHYDRATE 160; 4.5 UG/1; UG/1
160-4.5 AEROSOL RESPIRATORY (INHALATION) TWICE DAILY
Qty: 1 | Refills: 5 | Status: COMPLETED | COMMUNITY
Start: 2021-02-15 | End: 2021-07-27

## 2021-07-27 RX ORDER — LEVOCETIRIZINE DIHYDROCHLORIDE 5 MG/1
5 TABLET ORAL DAILY
Qty: 30 | Refills: 3 | Status: DISCONTINUED | COMMUNITY
Start: 2021-03-08 | End: 2021-07-27

## 2021-07-27 NOTE — ASSESSMENT
[FreeTextEntry1] : ALLERGIES\par doing better overall with flonase and singulair\par claritin\par \par gout\par check uric acid\par \par palpitations\par saw cardiology\par \par ASTHMA\par doing well, had episodic issues\par \par SLEEP ISSUES\par saw pulm, but has not followed up with pulm as of yet\par

## 2021-07-27 NOTE — PHYSICAL EXAM
[Well Nourished] : well nourished [Well Developed] : well developed [No Accessory Muscle Use] : no accessory muscle use [Regular Rhythm] : with a regular rhythm [Normal S1, S2] : normal S1 and S2 [Normal Affect] : the affect was normal [Normal Insight/Judgement] : insight and judgment were intact

## 2021-07-27 NOTE — HISTORY OF PRESENT ILLNESS
[FreeTextEntry8] : 41 year old male  here for annual well visit. Patient's blood work was drawn and medications reviewed. Patient's past medical history was reviewed, allergies verified and problems were identified and assessed. Patients medications were reviewed. Patient is feeling well with no new or active complaints at this time.\par \par \par \par

## 2021-07-27 NOTE — HEALTH RISK ASSESSMENT
[Very Good] : ~his/her~  mood as very good [] : No [Yes] : Yes [No falls in past year] : Patient reported no falls in the past year [0] : 2) Feeling down, depressed, or hopeless: Not at all (0) [PHQ-2 Negative - No further assessment needed] : PHQ-2 Negative - No further assessment needed [[id number]] : 0 [None] : None [With Significant Other] : lives with significant other [Employed] : employed [] :  [# Of Children ___] : has [unfilled] children [Fully functional (bathing, dressing, toileting, transferring, walking, feeding)] : Fully functional (bathing, dressing, toileting, transferring, walking, feeding) [Fully functional (using the telephone, shopping, preparing meals, housekeeping, doing laundry, using] : Fully functional and needs no help or supervision to perform IADLs (using the telephone, shopping, preparing meals, housekeeping, doing laundry, using transportation, managing medications and managing finances) [Smoke Detector] : smoke detector [Seat Belt] :  uses seat belt [de-identified] : con ed

## 2021-07-28 ENCOUNTER — NON-APPOINTMENT (OUTPATIENT)
Age: 41
End: 2021-07-28

## 2021-11-08 ENCOUNTER — APPOINTMENT (OUTPATIENT)
Dept: FAMILY MEDICINE | Facility: CLINIC | Age: 41
End: 2021-11-08
Payer: MEDICAID

## 2021-11-08 VITALS
SYSTOLIC BLOOD PRESSURE: 120 MMHG | OXYGEN SATURATION: 97 % | HEIGHT: 68 IN | HEART RATE: 70 BPM | DIASTOLIC BLOOD PRESSURE: 84 MMHG | WEIGHT: 248 LBS | TEMPERATURE: 98.2 F | BODY MASS INDEX: 37.59 KG/M2

## 2021-11-08 DIAGNOSIS — J30.9 ALLERGIC RHINITIS, UNSPECIFIED: ICD-10-CM

## 2021-11-08 DIAGNOSIS — F41.9 ANXIETY DISORDER, UNSPECIFIED: ICD-10-CM

## 2021-11-08 LAB
25(OH)D3 SERPL-MCNC: 19.1 NG/ML
ALBUMIN SERPL ELPH-MCNC: 4.6 G/DL
ALP BLD-CCNC: 100 U/L
ALT SERPL-CCNC: 22 U/L
ANION GAP SERPL CALC-SCNC: 13 MMOL/L
AST SERPL-CCNC: 18 U/L
BILIRUB SERPL-MCNC: 0.9 MG/DL
BUN SERPL-MCNC: 14 MG/DL
CALCIUM SERPL-MCNC: 9.6 MG/DL
CHLORIDE SERPL-SCNC: 103 MMOL/L
CHOLEST SERPL-MCNC: 203 MG/DL
CO2 SERPL-SCNC: 25 MMOL/L
CREAT SERPL-MCNC: 1.15 MG/DL
ESTIMATED AVERAGE GLUCOSE: 108 MG/DL
GLUCOSE SERPL-MCNC: 94 MG/DL
HBA1C MFR BLD HPLC: 5.4 %
HDLC SERPL-MCNC: 57 MG/DL
LDLC SERPL CALC-MCNC: 119 MG/DL
NONHDLC SERPL-MCNC: 146 MG/DL
POTASSIUM SERPL-SCNC: 4.9 MMOL/L
PROT SERPL-MCNC: 7.5 G/DL
SODIUM SERPL-SCNC: 140 MMOL/L
TRIGL SERPL-MCNC: 136 MG/DL
TSH SERPL-ACNC: 2.28 UIU/ML
URATE SERPL-MCNC: 9.6 MG/DL

## 2021-11-08 PROCEDURE — 99214 OFFICE O/P EST MOD 30 MIN: CPT | Mod: 25

## 2021-11-08 RX ORDER — DESLORATADINE 5 MG/1
5 TABLET ORAL
Qty: 90 | Refills: 1 | Status: DISCONTINUED | COMMUNITY
Start: 1900-01-01 | End: 2021-11-08

## 2021-11-08 NOTE — ASSESSMENT
[FreeTextEntry1] : obesity\par The diagnosis of obesity was discussed with the patient. The patient was counseled on diet and exercise. Patient was advised to eat a diet low in carbohydrates and low in fat with high protein diet with plenty of vegetables. Patient was counseled to eat small meals throughout the day avoiding carbohydrates, foods high in fat, foods that are fried and fast food.  Patient was advised to monitor fluid intake, to drink at least 8 glasses of pure water a day and reduce/stop intake of all sugar drinks including juice and soda. Patient was advised to try and exercise for 30-40 minutes per day for at least three days a week. Pros and cons of using medical management for weight loss versus diet/exercise alone was discussed. Medication options were provided for the patient and side affects and risks were identified and discussed.  Patient was advised to take any medications as prescribed and to call office or go to the ER immediately if any issues with the medications occur. All questions were answered.\par would like to restart phentermine\par \par ALLERGIES\par was doing better overall with flonase and singulair\par but having some issues, will try xyzal\par \par gout\par check uric acid, was started on on medications and will reassess\par \par cholesterol\par The diagnosis of high cholesterol is established and known to the patient and provider.  Previous blood work was evaluated at time of visit and reviewed with the patient. Blood work was drawn in office and results will be reviewed and followed. \par \par \par palpitations\par saw cardiology\par \par ASTHMA\par doing well, had episodic issues\par \par SLEEP ISSUES\par saw pulm, but has not followed up with pulm as of yet\par

## 2021-11-08 NOTE — HEALTH RISK ASSESSMENT
[] : No [Yes] : Yes [No falls in past year] : Patient reported no falls in the past year [0] : 2) Feeling down, depressed, or hopeless: Not at all (0) [PHQ-2 Negative - No further assessment needed] : PHQ-2 Negative - No further assessment needed [[id number]] : 0 [Very Good] : ~his/her~  mood as very good [None] : None [With Significant Other] : lives with significant other [Employed] : employed [] :  [# Of Children ___] : has [unfilled] children [Fully functional (bathing, dressing, toileting, transferring, walking, feeding)] : Fully functional (bathing, dressing, toileting, transferring, walking, feeding) [Fully functional (using the telephone, shopping, preparing meals, housekeeping, doing laundry, using] : Fully functional and needs no help or supervision to perform IADLs (using the telephone, shopping, preparing meals, housekeeping, doing laundry, using transportation, managing medications and managing finances) [Smoke Detector] : smoke detector [Seat Belt] :  uses seat belt [de-identified] : con ed

## 2021-11-08 NOTE — HISTORY OF PRESENT ILLNESS
[FreeTextEntry8] : 41 year old male is here for a followup visit. Patient is here for medication renewals and for blood work discussion. Medications and allergies were reviewed and assessed.  There has been no new medications since the last visit. Patient is feeling well with no active changes or issues since His last visit.\par \par \par \par

## 2021-12-21 ENCOUNTER — APPOINTMENT (OUTPATIENT)
Dept: FAMILY MEDICINE | Facility: CLINIC | Age: 41
End: 2021-12-21
Payer: MEDICAID

## 2021-12-21 LAB
25(OH)D3 SERPL-MCNC: 24.2 NG/ML
ALBUMIN SERPL ELPH-MCNC: 4.5 G/DL
ALP BLD-CCNC: 97 U/L
ALT SERPL-CCNC: 25 U/L
ANION GAP SERPL CALC-SCNC: 9 MMOL/L
AST SERPL-CCNC: 19 U/L
BILIRUB SERPL-MCNC: 0.5 MG/DL
BUN SERPL-MCNC: 15 MG/DL
CALCIUM SERPL-MCNC: 10 MG/DL
CHLORIDE SERPL-SCNC: 105 MMOL/L
CHOLEST SERPL-MCNC: 224 MG/DL
CO2 SERPL-SCNC: 28 MMOL/L
CREAT SERPL-MCNC: 1.23 MG/DL
GLUCOSE SERPL-MCNC: 98 MG/DL
HDLC SERPL-MCNC: 56 MG/DL
LDLC SERPL CALC-MCNC: 131 MG/DL
NONHDLC SERPL-MCNC: 168 MG/DL
POTASSIUM SERPL-SCNC: 5 MMOL/L
PROT SERPL-MCNC: 7.6 G/DL
SODIUM SERPL-SCNC: 142 MMOL/L
TRIGL SERPL-MCNC: 185 MG/DL
URATE SERPL-MCNC: 9.2 MG/DL

## 2021-12-21 PROCEDURE — 99214 OFFICE O/P EST MOD 30 MIN: CPT | Mod: 25

## 2021-12-21 NOTE — ASSESSMENT
[FreeTextEntry1] : obesity\par The diagnosis of obesity was discussed with the patient. The patient was counseled on diet and exercise. Patient was advised to eat a diet low in carbohydrates and low in fat with high protein diet with plenty of vegetables. Patient was counseled to eat small meals throughout the day avoiding carbohydrates, foods high in fat, foods that are fried and fast food.  Patient was advised to monitor fluid intake, to drink at least 8 glasses of pure water a day and reduce/stop intake of all sugar drinks including juice and soda. Patient was advised to try and exercise for 30-40 minutes per day for at least three days a week. Pros and cons of using medical management for weight loss versus diet/exercise alone was discussed. Medication options were provided for the patient and side affects and risks were identified and discussed.  Patient was advised to take any medications as prescribed and to call office or go to the ER immediately if any issues with the medications occur. All questions were answered.\par would like to restart phentermine, but decided against it due to panic attacks\par \par PANIC ATTACKS\par has history of severe panic attacks, out of nowhere, couple of times a week, getting nervous going through tunnels and bridges, panic attack only lasts a few minutes but feels like forever\par never used the xanax, last one was summer 2021\par \par ALLERGIES\par was doing better overall with flonase and singulair\par but having some issues, will try xyzal\par \par gout\par check uric acid, was started on on medications and will reassess\par \par cholesterol\par The diagnosis of high cholesterol is established and known to the patient and provider.  Previous blood work was evaluated at time of visit and reviewed with the patient. Blood work was drawn in office and results will be reviewed and followed. \par \par \par palpitations\par saw cardiology\par \par ASTHMA\par doing well, had episodic issues\par \par SLEEP ISSUES\par saw pulm, but has not followed up with pulm as of yet\par

## 2021-12-21 NOTE — HEALTH RISK ASSESSMENT
[Yes] : Yes [No falls in past year] : Patient reported no falls in the past year [0] : 2) Feeling down, depressed, or hopeless: Not at all (0) [PHQ-2 Negative - No further assessment needed] : PHQ-2 Negative - No further assessment needed [[id number]] : 0 [Very Good] : ~his/her~  mood as very good [None] : None [With Significant Other] : lives with significant other [Employed] : employed [] :  [# Of Children ___] : has [unfilled] children [Fully functional (bathing, dressing, toileting, transferring, walking, feeding)] : Fully functional (bathing, dressing, toileting, transferring, walking, feeding) [Fully functional (using the telephone, shopping, preparing meals, housekeeping, doing laundry, using] : Fully functional and needs no help or supervision to perform IADLs (using the telephone, shopping, preparing meals, housekeeping, doing laundry, using transportation, managing medications and managing finances) [Smoke Detector] : smoke detector [Seat Belt] :  uses seat belt [de-identified] : con ed

## 2022-01-06 ENCOUNTER — APPOINTMENT (OUTPATIENT)
Dept: OTOLARYNGOLOGY | Facility: CLINIC | Age: 42
End: 2022-01-06
Payer: MEDICAID

## 2022-01-06 DIAGNOSIS — M26.609 UNSPECIFIED TEMPOROMANDIBULAR JOINT DISORDER: ICD-10-CM

## 2022-01-06 PROCEDURE — 99213 OFFICE O/P EST LOW 20 MIN: CPT

## 2022-01-06 RX ORDER — LEVOCETIRIZINE DIHYDROCHLORIDE 5 MG/1
5 TABLET ORAL DAILY
Qty: 1 | Refills: 1 | Status: COMPLETED | COMMUNITY
Start: 2021-11-08 | End: 2022-01-06

## 2022-01-06 NOTE — ASSESSMENT
[FreeTextEntry1] : 41 year old male with temporamandibular joint syndrome now with worsening pain from dental issue. He will see dentist on Tuesday. I told him to maintain a soft diet, avoid chewing gum, NSAIDs, and warm compresses over the area.

## 2022-01-06 NOTE — PHYSICAL EXAM
[Midline] : trachea located in midline position [Normal] : no rashes [de-identified] : + TMJ click

## 2022-01-06 NOTE — HISTORY OF PRESENT ILLNESS
[de-identified] : 41 year old male with history of vertigo, VNG testing. Former patient of Dr. Javier. He did not follow up with Dr. Javier. Presents today for evaluation of left otalgia, vertigo and headache. States otalgia began about 1 week ago. Reports has vertigo been an ongoing issue, usually occurs when he uses a q-tip.  Denies fevers, drainage and hearing issues. The otalgia is mainly on the left side. He has also been having dental pain. He was told he had TMJ syndrome by his dentist. He is supposed to see his dentist on Tuesday.  [None] : No relevant exposures have been identified.

## 2022-02-08 ENCOUNTER — APPOINTMENT (OUTPATIENT)
Dept: FAMILY MEDICINE | Facility: CLINIC | Age: 42
End: 2022-02-08
Payer: MEDICAID

## 2022-02-08 VITALS
WEIGHT: 248 LBS | HEIGHT: 68 IN | TEMPERATURE: 98 F | DIASTOLIC BLOOD PRESSURE: 82 MMHG | BODY MASS INDEX: 37.59 KG/M2 | SYSTOLIC BLOOD PRESSURE: 130 MMHG | HEART RATE: 71 BPM | OXYGEN SATURATION: 98 %

## 2022-02-08 DIAGNOSIS — J45.909 UNSPECIFIED ASTHMA, UNCOMPLICATED: ICD-10-CM

## 2022-02-08 PROCEDURE — 99214 OFFICE O/P EST MOD 30 MIN: CPT

## 2022-02-08 NOTE — HEALTH RISK ASSESSMENT
[Yes] : Yes [No falls in past year] : Patient reported no falls in the past year [0] : 2) Feeling down, depressed, or hopeless: Not at all (0) [PHQ-2 Negative - No further assessment needed] : PHQ-2 Negative - No further assessment needed [[id number]] : 0 [Very Good] : ~his/her~  mood as very good [None] : None [With Significant Other] : lives with significant other [Employed] : employed [] :  [# Of Children ___] : has [unfilled] children [Fully functional (bathing, dressing, toileting, transferring, walking, feeding)] : Fully functional (bathing, dressing, toileting, transferring, walking, feeding) [Fully functional (using the telephone, shopping, preparing meals, housekeeping, doing laundry, using] : Fully functional and needs no help or supervision to perform IADLs (using the telephone, shopping, preparing meals, housekeeping, doing laundry, using transportation, managing medications and managing finances) [Smoke Detector] : smoke detector [Seat Belt] :  uses seat belt [de-identified] : con ed

## 2022-02-08 NOTE — HISTORY OF PRESENT ILLNESS
[FreeTextEntry8] : 41 year old male is here for a followup visit. Patient is here for medication renewals and for blood work discussion. Medications and allergies were reviewed and assessed.  There has been no new medications since the last visit. \par \par depression/anxiety\par heart skipping beat\par \par \par \par

## 2022-02-08 NOTE — ASSESSMENT
[FreeTextEntry1] : obesity\par The diagnosis of obesity was discussed with the patient. The patient was counseled on diet and exercise. Patient was advised to eat a diet low in carbohydrates and low in fat with high protein diet with plenty of vegetables. Patient was counseled to eat small meals throughout the day avoiding carbohydrates, foods high in fat, foods that are fried and fast food.  Patient was advised to monitor fluid intake, to drink at least 8 glasses of pure water a day and reduce/stop intake of all sugar drinks including juice and soda. Patient was advised to try and exercise for 30-40 minutes per day for at least three days a week. Pros and cons of using medical management for weight loss versus diet/exercise alone was discussed. Medication options were provided for the patient and side affects and risks were identified and discussed.  Patient was advised to take any medications as prescribed and to call office or go to the ER immediately if any issues with the medications occur. All questions were answered.\par would like to restart phentermine, but decided against it due to panic attacks\par \par PANIC ATTACKS\par has history of severe panic attacks, out of nowhere, couple of times a week, getting nervous going through tunnels and bridges, panic attack only lasts a few minutes but feels like forever\par never used the xanax, last one was summer 2021\par \par ANXIETY/DEPRESSION\par Discussed diagnosis of anxiety and depression with the patient and potential outcomes/side affects of treatment versus non treatment. Medications were assessed and described at length. Side affects and black box warning were discussed.  Patient was advised to continue will all medications prescribed and the need for compliance was discussed and emphasized. Patient was advised to not stop medications without discussing with a health care provider first. Patient was advised to continue psychotherapy or seek therapy if not currently attending. Patient was educated on addictive potential of controlled substances and was counseled to use only as needed and sparingly. Patient verbalized understanding of all the above and all questions were answered.\par 2/22 - Gets angry easily, depressed, gets anxious about health, feels heart is fluttering\par discussed medications, not ready at this time, will do counseling\par \par heart fluttering\par seeing cardio next week - \par ALLERGIES\par was doing better overall with flonase and singulair\par but having some issues, will try xyzal\par \par gout\par check uric acid, was started on on medications and will reassess\par \par cholesterol\par The diagnosis of high cholesterol is established and known to the patient and provider.  Previous blood work was evaluated at time of visit and reviewed with the patient. Blood work was drawn in office and results will be reviewed and followed. \par reviewed bw, was elevated\par \par ASTHMA\par doing well, had episodic issues\par \par SLEEP ISSUES\par saw pulm, but has not followed up with pulm as of yet\par

## 2022-02-11 ENCOUNTER — APPOINTMENT (OUTPATIENT)
Dept: GASTROENTEROLOGY | Facility: CLINIC | Age: 42
End: 2022-02-11
Payer: MEDICAID

## 2022-02-11 VITALS
WEIGHT: 249 LBS | HEIGHT: 68 IN | BODY MASS INDEX: 37.74 KG/M2 | DIASTOLIC BLOOD PRESSURE: 84 MMHG | SYSTOLIC BLOOD PRESSURE: 137 MMHG | HEART RATE: 75 BPM

## 2022-02-11 DIAGNOSIS — R10.13 EPIGASTRIC PAIN: ICD-10-CM

## 2022-02-11 DIAGNOSIS — Z72.89 OTHER PROBLEMS RELATED TO LIFESTYLE: ICD-10-CM

## 2022-02-11 DIAGNOSIS — R14.0 ABDOMINAL DISTENSION (GASEOUS): ICD-10-CM

## 2022-02-11 DIAGNOSIS — Z78.9 OTHER SPECIFIED HEALTH STATUS: ICD-10-CM

## 2022-02-11 PROCEDURE — 99203 OFFICE O/P NEW LOW 30 MIN: CPT

## 2022-02-11 RX ORDER — CALCIUM CARBONATE 500 MG/1
TABLET, CHEWABLE ORAL
Refills: 0 | Status: ACTIVE | COMMUNITY

## 2022-02-11 RX ORDER — NAPROXEN SODIUM 220 MG/1
220 CAPSULE, LIQUID FILLED ORAL
Refills: 0 | Status: ACTIVE | COMMUNITY

## 2022-02-11 NOTE — PHYSICAL EXAM
[General Appearance - Alert] : alert [General Appearance - In No Acute Distress] : in no acute distress [General Appearance - Well Nourished] : well nourished [General Appearance - Well Developed] : well developed [Sclera] : the sclera and conjunctiva were normal [Neck Appearance] : the appearance of the neck was normal [Neck Cervical Mass (___cm)] : no neck mass was observed [Jugular Venous Distention Increased] : there was no jugular-venous distention [Thyroid Diffuse Enlargement] : the thyroid was not enlarged [Thyroid Nodule] : there were no palpable thyroid nodules [Auscultation Breath Sounds / Voice Sounds] : lungs were clear to auscultation bilaterally [Heart Rate And Rhythm] : heart rate was normal and rhythm regular [Heart Sounds Gallop] : no gallops [Murmurs] : no murmurs [Heart Sounds Pericardial Friction Rub] : no pericardial rub [Full Pulse] : the pedal pulses are present [Edema] : there was no peripheral edema [Bowel Sounds] : normal bowel sounds [Abdomen Soft] : soft [Abdomen Tenderness] : non-tender [] : no hepato-splenomegaly [Abdomen Mass (___ Cm)] : no abdominal mass palpated [Abdomen Hernia] : no hernia was discovered [Patient Refused] : rectal exam was refused by the patient [Cervical Lymph Nodes Enlarged Posterior Bilaterally] : posterior cervical [Cervical Lymph Nodes Enlarged Anterior Bilaterally] : anterior cervical [Supraclavicular Lymph Nodes Enlarged Bilaterally] : supraclavicular [Inguinal Lymph Nodes Enlarged Bilaterally] : inguinal [Nail Clubbing] : no clubbing  or cyanosis of the fingernails [Musculoskeletal - Swelling] : no joint swelling seen [Skin Color & Pigmentation] : normal skin color and pigmentation [Skin Turgor] : normal skin turgor [Oriented To Time, Place, And Person] : oriented to person, place, and time [Impaired Insight] : insight and judgment were intact [Affect] : the affect was normal [FreeTextEntry1] : multiple tattoos

## 2022-02-11 NOTE — CONSULT LETTER
[Dear  ___] : Dear  [unfilled], [Courtesy Letter:] : I had the pleasure of seeing your patient, [unfilled], in my office today. [Please see my note below.] : Please see my note below. [Consult Closing:] : Thank you very much for allowing me to participate in the care of this patient.  If you have any questions, please do not hesitate to contact me. [Sincerely,] : Sincerely, [FreeTextEntry3] : Adebayo Perez M.D.\par

## 2022-02-11 NOTE — HISTORY OF PRESENT ILLNESS
[FreeTextEntry1] : Ester presents with epigastric burning, bloating, gassiness for years, but this has been somewhat worse lately.  He thinks that symptoms began shortly after undergoing laparoscopic appendectomy in 2014.  He has tried Tums, with some improvement.  He averages at least 1 BM daily.  From time to time, he awakens with a sour stomach.  He is unsure if his uncle had cancer of the pancreas or colon.

## 2022-02-11 NOTE — ASSESSMENT
[FreeTextEntry1] : 1.  Epigastric burning, bloating, gassiness--possible gastritis, Helicobacter pylori, ulcer.  Symptoms may be functional in origin.\par 2.  Obesity.\par 3.  Anxiety/depression, history of panic attacks.\par 4.  Status post COVID-19 x 2.\par 5.  History of gout; hyperuricemia.\par 6.  Hypercholesterolemia.\par 7.  Palpitations.\par 8.  History of asthma.\par 9.  TMJ syndrome\par 10.  Status post appendectomy.\par 11.  Seasonal allergies; allergic to codeine.\par \par Plan:\par 1.  Extensive medical records reviewed.\par 2.  [facility] brochure on GERD given and discussed.\par 3.  Try PPI-OTC daily on awakening for 2 weeks.\par 4.  If symptoms continue to be problematic despite PPI, schedule EGD--Procedure, rationale, alternatives, material risks, and anesthesia plan were reviewed and brochure given.\par

## 2022-02-11 NOTE — REVIEW OF SYSTEMS
[Negative] : Heme/Lymph [As Noted in HPI] : as noted in HPI [Abdominal Pain] : abdominal pain [FreeTextEntry5] : Fluttering in heart/heart skipping a beat [FreeTextEntry7] : Gassy, bloating [FreeTextEntry1] : Pt Gout

## 2022-02-15 RX ORDER — ALBUTEROL SULFATE 90 UG/1
108 (90 BASE) AEROSOL, METERED RESPIRATORY (INHALATION)
Qty: 3 | Refills: 5 | Status: ACTIVE | COMMUNITY
Start: 2019-07-15 | End: 1900-01-01

## 2022-02-17 ENCOUNTER — APPOINTMENT (OUTPATIENT)
Dept: CARDIOLOGY | Facility: CLINIC | Age: 42
End: 2022-02-17
Payer: MEDICAID

## 2022-02-17 ENCOUNTER — NON-APPOINTMENT (OUTPATIENT)
Age: 42
End: 2022-02-17

## 2022-02-17 VITALS
OXYGEN SATURATION: 96 % | TEMPERATURE: 98.2 F | HEIGHT: 68 IN | SYSTOLIC BLOOD PRESSURE: 132 MMHG | HEART RATE: 82 BPM | BODY MASS INDEX: 38.65 KG/M2 | WEIGHT: 255 LBS | DIASTOLIC BLOOD PRESSURE: 80 MMHG

## 2022-02-17 DIAGNOSIS — R00.2 PALPITATIONS: ICD-10-CM

## 2022-02-17 PROCEDURE — 93000 ELECTROCARDIOGRAM COMPLETE: CPT

## 2022-02-17 PROCEDURE — 99213 OFFICE O/P EST LOW 20 MIN: CPT

## 2022-02-17 NOTE — DISCUSSION/SUMMARY
[FreeTextEntry1] : 41M with ongoing palpitations\par \par Educated on triggers and etiologies of palpitations. Encourage hydration, avoid caffeine\par Will attach 4 day ZIO to rule out arrhythmia \par Repeat echo given prior COVID infection\par Stress management \par \par -Diet, exercise, weight loss recommended\par \par RV 6M

## 2022-02-17 NOTE — HISTORY OF PRESENT ILLNESS
[FreeTextEntry1] : 41M who presents for follow up for palpitations,\par \par Last seen April 2020\par He has noted more frequent palpitations as of late\par Frequent fluttering in chest, notices more while under stress\par Drinks a can of soda every day along with tea\par No CP, dyspnea\par Some lightheadedness with these episodes\par Similar complaints in 2020\par Had COVID x2, vaccinated without Booster\par \par HISTORY:\par \par Early Sept 2019, urgent care visit for nausea, dizziness, headaches. Had ECG done at urgent care, was recommended to see ENT for vertigo. ENT recommended cardiac work up. At the time he went to urgent care, he denied CP, SOB. Symptoms resolved mostly but returned a week later.  Had profound nausea and lightheadedness. Subsequently resolved. Notes these episodes have happened in the past, sporadically. Of note had been on phentermine in the past but stopped.\par \par Nonsmoker. Denies family hx of CAD. Works for [employer]. \par \par ECG: SR with nonspecific IVCD, LAFB\par Lipids 5/19: 184/44/119/105\par EST: 9 minutes, blunted BP response with exercise\par TTE: Normal

## 2022-03-07 ENCOUNTER — APPOINTMENT (OUTPATIENT)
Dept: INTERNAL MEDICINE | Facility: CLINIC | Age: 42
End: 2022-03-07

## 2022-03-08 DIAGNOSIS — Z01.818 ENCOUNTER FOR OTHER PREPROCEDURAL EXAMINATION: ICD-10-CM

## 2022-03-18 ENCOUNTER — APPOINTMENT (OUTPATIENT)
Dept: OTOLARYNGOLOGY | Facility: CLINIC | Age: 42
End: 2022-03-18

## 2022-04-25 ENCOUNTER — APPOINTMENT (OUTPATIENT)
Dept: GASTROENTEROLOGY | Facility: CLINIC | Age: 42
End: 2022-04-25

## 2022-05-09 ENCOUNTER — NON-APPOINTMENT (OUTPATIENT)
Age: 42
End: 2022-05-09

## 2022-05-11 ENCOUNTER — APPOINTMENT (OUTPATIENT)
Dept: FAMILY MEDICINE | Facility: CLINIC | Age: 42
End: 2022-05-11
Payer: MEDICAID

## 2022-05-11 PROCEDURE — 99214 OFFICE O/P EST MOD 30 MIN: CPT | Mod: 95

## 2022-05-11 RX ORDER — PHENTERMINE HYDROCHLORIDE 15 MG/1
15 CAPSULE ORAL
Qty: 30 | Refills: 0 | Status: ACTIVE | COMMUNITY
Start: 2019-05-17 | End: 1900-01-01

## 2022-05-11 NOTE — HISTORY OF PRESENT ILLNESS
[Home] : at home, [unfilled] , at the time of the visit. [Medical Office: ([facility])___] : at the medical office located in  [Verbal consent obtained from patient] : the patient, [unfilled] [FreeTextEntry8] : 42 year old male here with complaints of gout flare up. Patients active medications, allergies and issues were all reviewed with the patient at time of visit.\par \par \par depression/anxiety\par heart skipping beat\par \par \par \par

## 2022-05-11 NOTE — HEALTH RISK ASSESSMENT
[Yes] : Yes [No falls in past year] : Patient reported no falls in the past year [0] : 2) Feeling down, depressed, or hopeless: Not at all (0) [PHQ-2 Negative - No further assessment needed] : PHQ-2 Negative - No further assessment needed [Very Good] : ~his/her~  mood as very good [None] : None [With Significant Other] : lives with significant other [Employed] : employed [] :  [# Of Children ___] : has [unfilled] children [Fully functional (bathing, dressing, toileting, transferring, walking, feeding)] : Fully functional (bathing, dressing, toileting, transferring, walking, feeding) [Fully functional (using the telephone, shopping, preparing meals, housekeeping, doing laundry, using] : Fully functional and needs no help or supervision to perform IADLs (using the telephone, shopping, preparing meals, housekeeping, doing laundry, using transportation, managing medications and managing finances) [Smoke Detector] : smoke detector [Seat Belt] :  uses seat belt [[id number]] : 0 [de-identified] : con ed

## 2022-05-11 NOTE — ASSESSMENT
[FreeTextEntry1] : gout\par 12/21 was increased to allopurinol 300, but then stopped on his own a month\par so now with gout attack, severe in first toe, will give indomethacin for acute flare, spent time, explaining need for compliance to prevent for further attacks\par \par \par obesity\par The diagnosis of obesity was discussed with the patient. The patient was counseled on diet and exercise. Patient was advised to eat a diet low in carbohydrates and low in fat with high protein diet with plenty of vegetables. Patient was counseled to eat small meals throughout the day avoiding carbohydrates, foods high in fat, foods that are fried and fast food.  Patient was advised to monitor fluid intake, to drink at least 8 glasses of pure water a day and reduce/stop intake of all sugar drinks including juice and soda. Patient was advised to try and exercise for 30-40 minutes per day for at least three days a week. Pros and cons of using medical management for weight loss versus diet/exercise alone was discussed. Medication options were provided for the patient and side affects and risks were identified and discussed.  Patient was advised to take any medications as prescribed and to call office or go to the ER immediately if any issues with the medications occur. All questions were answered.\par would like to restart phentermine, but decided against it due to panic attacks\par 5/11/22 wishes to restart, discussed, will fu in 4 weeks\par \par PANIC ATTACKS\par has history of severe panic attacks, out of nowhere, couple of times a week, getting nervous going through tunnels and bridges, panic attack only lasts a few minutes but feels like forever\par never used the xanax, last one was summer 2021\par \par ANXIETY/DEPRESSION\par Discussed diagnosis of anxiety and depression with the patient and potential outcomes/side affects of treatment versus non treatment. Medications were assessed and described at length. Side affects and black box warning were discussed.  Patient was advised to continue will all medications prescribed and the need for compliance was discussed and emphasized. Patient was advised to not stop medications without discussing with a health care provider first. Patient was advised to continue psychotherapy or seek therapy if not currently attending. Patient was educated on addictive potential of controlled substances and was counseled to use only as needed and sparingly. Patient verbalized understanding of all the above and all questions were answered.\par 2/22 - Gets angry easily, depressed, gets anxious about health, feels heart is fluttering\par discussed medications, not ready at this time, will do counseling\par \par heart fluttering\par seeing cardio next week - \par ALLERGIES\par was doing better overall with flonase and singulair\par but having some issues, will try xyzal\par \par \par cholesterol\par The diagnosis of high cholesterol is established and known to the patient and provider.  Previous blood work was evaluated at time of visit and reviewed with the patient. Blood work was drawn in office and results will be reviewed and followed. \par reviewed bw, was elevated\par \par ASTHMA\par doing well, had episodic issues\par \par SLEEP ISSUES\par saw pulm, but has not followed up with pulm as of yet\par

## 2022-05-13 RX ORDER — MEDICAL SUPPLY, MISCELLANEOUS
EACH MISCELLANEOUS
Qty: 1 | Refills: 0 | Status: ACTIVE | COMMUNITY
Start: 2022-05-13 | End: 1900-01-01

## 2022-05-17 ENCOUNTER — NON-APPOINTMENT (OUTPATIENT)
Age: 42
End: 2022-05-17

## 2022-05-23 ENCOUNTER — NON-APPOINTMENT (OUTPATIENT)
Age: 42
End: 2022-05-23

## 2022-06-01 ENCOUNTER — NON-APPOINTMENT (OUTPATIENT)
Age: 42
End: 2022-06-01

## 2022-06-08 ENCOUNTER — APPOINTMENT (OUTPATIENT)
Dept: FAMILY MEDICINE | Facility: CLINIC | Age: 42
End: 2022-06-08
Payer: MEDICAID

## 2022-06-08 VITALS
SYSTOLIC BLOOD PRESSURE: 130 MMHG | TEMPERATURE: 98 F | HEART RATE: 77 BPM | WEIGHT: 248 LBS | BODY MASS INDEX: 37.59 KG/M2 | HEIGHT: 68 IN | OXYGEN SATURATION: 98 % | DIASTOLIC BLOOD PRESSURE: 78 MMHG

## 2022-06-08 DIAGNOSIS — M79.676 PAIN IN UNSPECIFIED TOE(S): ICD-10-CM

## 2022-06-08 DIAGNOSIS — F41.0 PANIC DISORDER [EPISODIC PAROXYSMAL ANXIETY]: ICD-10-CM

## 2022-06-08 DIAGNOSIS — E78.00 PURE HYPERCHOLESTEROLEMIA, UNSPECIFIED: ICD-10-CM

## 2022-06-08 DIAGNOSIS — E55.9 VITAMIN D DEFICIENCY, UNSPECIFIED: ICD-10-CM

## 2022-06-08 DIAGNOSIS — E66.9 OBESITY, UNSPECIFIED: ICD-10-CM

## 2022-06-08 PROCEDURE — 99214 OFFICE O/P EST MOD 30 MIN: CPT | Mod: 25

## 2022-06-08 RX ORDER — CEPHALEXIN 500 MG/1
500 CAPSULE ORAL
Qty: 20 | Refills: 0 | Status: DISCONTINUED | COMMUNITY
Start: 2022-05-13 | End: 2022-06-08

## 2022-06-08 RX ORDER — INDOMETHACIN 25 MG/1
25 CAPSULE ORAL 3 TIMES DAILY
Qty: 15 | Refills: 0 | Status: DISCONTINUED | COMMUNITY
Start: 2022-05-11 | End: 2022-06-08

## 2022-06-08 NOTE — HEALTH RISK ASSESSMENT
[Yes] : Yes [No falls in past year] : Patient reported no falls in the past year [0] : 2) Feeling down, depressed, or hopeless: Not at all (0) [PHQ-2 Negative - No further assessment needed] : PHQ-2 Negative - No further assessment needed [[id number]] : 0 [Very Good] : ~his/her~  mood as very good [None] : None [With Significant Other] : lives with significant other [Employed] : employed [] :  [# Of Children ___] : has [unfilled] children [Fully functional (bathing, dressing, toileting, transferring, walking, feeding)] : Fully functional (bathing, dressing, toileting, transferring, walking, feeding) [Fully functional (using the telephone, shopping, preparing meals, housekeeping, doing laundry, using] : Fully functional and needs no help or supervision to perform IADLs (using the telephone, shopping, preparing meals, housekeeping, doing laundry, using transportation, managing medications and managing finances) [Smoke Detector] : smoke detector [Seat Belt] :  uses seat belt [de-identified] : con ed

## 2022-06-08 NOTE — PHYSICAL EXAM
[Well Nourished] : well nourished [Well Developed] : well developed [No Accessory Muscle Use] : no accessory muscle use [Regular Rhythm] : with a regular rhythm [Normal S1, S2] : normal S1 and S2 [Normal Affect] : the affect was normal [Normal Insight/Judgement] : insight and judgment were intact [de-identified] : tenderness to head of first toe MT

## 2022-06-08 NOTE — REVIEW OF SYSTEMS
[Insomnia] : insomnia [Anxiety] : anxiety [Negative] : Heme/Lymph [FreeTextEntry9] : first toe pain on the right

## 2022-06-08 NOTE — HISTORY OF PRESENT ILLNESS
[Verbal consent obtained from patient] : the patient, [unfilled] [FreeTextEntry8] : 42 year old male here with complaints of gout flare up. Patients active medications, allergies and issues were all reviewed with the patient at time of visit.\par \par depression/anxiety\par \par \par \par \par

## 2022-06-08 NOTE — ASSESSMENT
[FreeTextEntry1] : gout\par 12/21 was increased to allopurinol 300, but then stopped on his own a month\par so now with gout attack, severe in first toe, will give indomethacin for acute flare, spent time, explaining need for compliance to prevent for further attacks\par \par had severe gout attack, went to urgent care, had xray, will get copy\par will send to [facility]\par \par obesity\par The diagnosis of obesity was discussed with the patient. The patient was counseled on diet and exercise. Patient was advised to eat a diet low in carbohydrates and low in fat with high protein diet with plenty of vegetables. Patient was counseled to eat small meals throughout the day avoiding carbohydrates, foods high in fat, foods that are fried and fast food.  Patient was advised to monitor fluid intake, to drink at least 8 glasses of pure water a day and reduce/stop intake of all sugar drinks including juice and soda. Patient was advised to try and exercise for 30-40 minutes per day for at least three days a week. Pros and cons of using medical management for weight loss versus diet/exercise alone was discussed. Medication options were provided for the patient and side affects and risks were identified and discussed.  Patient was advised to take any medications as prescribed and to call office or go to the ER immediately if any issues with the medications occur. All questions were answered.\par would like to restart phentermine, but decided against it due to panic attacks\par 5/11/22 wishes to restart, discussed, will fu in 4 weeks\par \par PANIC ATTACKS\par has history of severe panic attacks, out of nowhere, couple of times a week, getting nervous going through tunnels and bridges, panic attack only lasts a few minutes but feels like forever\par never used the xanax, last one was summer 2021\par \par ANXIETY/DEPRESSION\par Discussed diagnosis of anxiety and depression with the patient and potential outcomes/side affects of treatment versus non treatment. Medications were assessed and described at length. Side affects and black box warning were discussed.  Patient was advised to continue will all medications prescribed and the need for compliance was discussed and emphasized. Patient was advised to not stop medications without discussing with a health care provider first. Patient was advised to continue psychotherapy or seek therapy if not currently attending. Patient was educated on addictive potential of controlled substances and was counseled to use only as needed and sparingly. Patient verbalized understanding of all the above and all questions were answered.\par 2/22 - Gets angry easily, depressed, gets anxious about health, feels heart is fluttering\par discussed medications, not ready at this time, will do counseling\par \par heart fluttering\par seeing cardio next week - \par \par ALLERGIES\par was doing better overall with flonase and singulair\par but having some issues, will try xyzal\par \par cholesterol\par The diagnosis of high cholesterol is established and known to the patient and provider.  Previous blood work was evaluated at time of visit and reviewed with the patient. Blood work was drawn in office and results will be reviewed and followed. \par reviewed bw, was elevated\par \par ASTHMA\par doing well, had episodic issues\par \par SLEEP ISSUES\par saw pulm, but has not followed up with pulm as of yet\par

## 2022-06-09 LAB
25(OH)D3 SERPL-MCNC: 20.7 NG/ML
ALBUMIN SERPL ELPH-MCNC: 4.3 G/DL
ALP BLD-CCNC: 89 U/L
ALT SERPL-CCNC: 22 U/L
ANION GAP SERPL CALC-SCNC: 12 MMOL/L
AST SERPL-CCNC: 24 U/L
BASOPHILS # BLD AUTO: 0.04 K/UL
BASOPHILS NFR BLD AUTO: 0.4 %
BILIRUB SERPL-MCNC: 0.6 MG/DL
BUN SERPL-MCNC: 14 MG/DL
CALCIUM SERPL-MCNC: 9 MG/DL
CHLORIDE SERPL-SCNC: 103 MMOL/L
CHOLEST SERPL-MCNC: 167 MG/DL
CO2 SERPL-SCNC: 26 MMOL/L
CREAT SERPL-MCNC: 1.05 MG/DL
EGFR: 92 ML/MIN/1.73M2
EOSINOPHIL # BLD AUTO: 0.42 K/UL
EOSINOPHIL NFR BLD AUTO: 4.4 %
GLUCOSE SERPL-MCNC: 78 MG/DL
HCT VFR BLD CALC: 44.8 %
HDLC SERPL-MCNC: 48 MG/DL
HGB BLD-MCNC: 14.3 G/DL
IMM GRANULOCYTES NFR BLD AUTO: 0.2 %
LDLC SERPL CALC-MCNC: 88 MG/DL
LYMPHOCYTES # BLD AUTO: 2.45 K/UL
LYMPHOCYTES NFR BLD AUTO: 25.5 %
MAN DIFF?: NORMAL
MCHC RBC-ENTMCNC: 29.5 PG
MCHC RBC-ENTMCNC: 31.9 GM/DL
MCV RBC AUTO: 92.4 FL
MONOCYTES # BLD AUTO: 0.63 K/UL
MONOCYTES NFR BLD AUTO: 6.6 %
NEUTROPHILS # BLD AUTO: 6.03 K/UL
NEUTROPHILS NFR BLD AUTO: 62.9 %
NONHDLC SERPL-MCNC: 119 MG/DL
PLATELET # BLD AUTO: 220 K/UL
POTASSIUM SERPL-SCNC: 4.3 MMOL/L
PROT SERPL-MCNC: 6.8 G/DL
RBC # BLD: 4.85 M/UL
RBC # FLD: 14.6 %
SODIUM SERPL-SCNC: 141 MMOL/L
TRIGL SERPL-MCNC: 156 MG/DL
TSH SERPL-ACNC: 1.7 UIU/ML
URATE SERPL-MCNC: 5.3 MG/DL
WBC # FLD AUTO: 9.59 K/UL

## 2022-06-09 RX ORDER — ERGOCALCIFEROL 1.25 MG/1
1.25 MG CAPSULE, LIQUID FILLED ORAL
Qty: 12 | Refills: 3 | Status: ACTIVE | COMMUNITY
Start: 2021-07-28 | End: 1900-01-01

## 2022-06-10 LAB
ESTIMATED AVERAGE GLUCOSE: 114 MG/DL
HBA1C MFR BLD HPLC: 5.6 %

## 2022-06-20 ENCOUNTER — NON-APPOINTMENT (OUTPATIENT)
Age: 42
End: 2022-06-20

## 2022-06-20 ENCOUNTER — APPOINTMENT (OUTPATIENT)
Dept: ORTHOPEDIC SURGERY | Facility: CLINIC | Age: 42
End: 2022-06-20

## 2022-06-20 DIAGNOSIS — M79.676 PAIN IN UNSPECIFIED TOE(S): ICD-10-CM

## 2022-06-20 PROCEDURE — 99243 OFF/OP CNSLTJ NEW/EST LOW 30: CPT

## 2022-06-20 PROCEDURE — 99203 OFFICE O/P NEW LOW 30 MIN: CPT

## 2022-06-20 NOTE — HISTORY OF PRESENT ILLNESS
[FreeTextEntry1] : The patient is a 42 year old male with history of Gout presenting for an initial evaluation of his right foot. The patient reports a flare up of Gout in the first week of May 2022. He was evaluated for this at  where he was placed on Allopurinol, Colchicine, and naproxen with no improvement in pain and swelling. He does take Allopurinol 100 mg prior to the flare up. Due to limited relief and improvement in pain, he was recommended Keflex which he has completed in May 2022. History of Codeine allergy. The patient is currently out of work due to an upcoming move. He is wearing slip-on sandals and is walking without assistance. No other complaints.

## 2022-06-20 NOTE — PHYSICAL EXAM
[de-identified] : General: Alert and oriented x3. In no acute distress. Pleasant in nature with a normal affect. No apparent respiratory distress.\par \par Right Foot Exam\par Skin: Clean, dry, intact\par Inspection: Medial eminence MTP. No obvious malalignment, no masses, no swelling, no effusion\par Pulses: 2+ DP/PT pulses\par ROM: FOOT Full  ROM of digits, ANKLE 10 degrees of dorsiflexion, 40 degrees of plantarflexion, 10 degrees of subtalar motion.\par Painful ROM: None\par Tenderness: Medial eminence MTP pain. No tenderness over the medial malleolus, No tenderness over the lateral malleolus, no CFL/ATFL/PTFL pain, no deltoid ligament pain. No heel pain. No Achilles tenderness. No 5th metatarsal pain. No pain to the LisFranc joint. No ttp over the posterior tibial tendon.\par Stability: Negative anterior/posterior drawer.\par Strength: 5/5 ADD/ABD/TA/GS/EHL/FHL/EDL\par Neuro: Sensation in tact to light touch throughout\par Additional tests: Negative Mortons test, negative tarsal tunnel tinels, negative single heel rise.			\par \par R Big Toe Exam\par ROM Great toe: Maximal 50 degrees of dorsiflexion, 40 degrees of plantarflexion. No pain with ROM. Stable drawer.  [de-identified] : Xrays of right foot obtained from [facility] on 5/18/2022 and reviewed in the office today, 06/20/2022 , revealed: No acute fractures. \par

## 2022-06-20 NOTE — CONSULT LETTER
[Dear  ___] : Dear  [unfilled], [Consult Letter:] : I had the pleasure of evaluating your patient, [unfilled]. [( Thank you for referring [unfilled] for consultation for _____ )] : Thank you for referring [unfilled] for consultation for [unfilled] [Please see my note below.] : Please see my note below. [Consult Closing:] : Thank you very much for allowing me to participate in the care of this patient.  If you have any questions, please do not hesitate to contact me. [Sincerely,] : Sincerely, [FreeTextEntry3] : Ubaldo Vargas, DO\par Orthopaedic Surgery\par Foot and Ankle Surgery\par [address] [facility]\par [place] [id number]

## 2022-06-20 NOTE — DISCUSSION/SUMMARY
[de-identified] : Today I had a lengthy discussion with the patient regarding their right great toe pain. I have addressed all the patient's concerns surrounding the pathology of their condition. XR imaging results were reviewed with the patient. I recommend that the patient utilize a methylprednisolone steroid taper pack. The prescription was provided in the office today. The patient understood and verbally agreed to the treatment plan. All of their questions were answered and they were satisfied with the visit. The patient should call the office if they have any questions or experience worsening symptoms. I would like to see the patient back in the office in 3 weeks to reassess their condition. 				\par

## 2022-07-25 ENCOUNTER — APPOINTMENT (OUTPATIENT)
Dept: ORTHOPEDIC SURGERY | Facility: CLINIC | Age: 42
End: 2022-07-25

## 2022-07-25 ENCOUNTER — NON-APPOINTMENT (OUTPATIENT)
Age: 42
End: 2022-07-25

## 2022-07-25 PROCEDURE — 99441: CPT

## 2022-07-27 ENCOUNTER — APPOINTMENT (OUTPATIENT)
Dept: FAMILY MEDICINE | Facility: CLINIC | Age: 42
End: 2022-07-27

## 2022-07-27 DIAGNOSIS — M10.9 GOUT, UNSPECIFIED: ICD-10-CM

## 2022-07-27 DIAGNOSIS — M79.674 PAIN IN RIGHT TOE(S): ICD-10-CM

## 2022-07-27 DIAGNOSIS — F41.8 OTHER SPECIFIED ANXIETY DISORDERS: ICD-10-CM

## 2022-07-27 PROCEDURE — 99443: CPT

## 2022-07-27 RX ORDER — MONTELUKAST 10 MG/1
10 TABLET, FILM COATED ORAL
Qty: 90 | Refills: 1 | Status: ACTIVE | COMMUNITY
Start: 2021-07-10 | End: 1900-01-01

## 2022-07-27 RX ORDER — METHYLPREDNISOLONE 4 MG/1
4 TABLET ORAL
Qty: 1 | Refills: 0 | Status: ACTIVE | COMMUNITY
Start: 2022-06-20 | End: 1900-01-01

## 2022-07-27 RX ORDER — LORATADINE 10 MG/1
10 TABLET ORAL
Qty: 90 | Refills: 1 | Status: ACTIVE | COMMUNITY
Start: 1900-01-01 | End: 1900-01-01

## 2022-07-27 RX ORDER — ALLOPURINOL 300 MG/1
300 TABLET ORAL
Qty: 90 | Refills: 1 | Status: ACTIVE | COMMUNITY
Start: 2021-07-28 | End: 1900-01-01

## 2022-07-27 NOTE — ASSESSMENT
[FreeTextEntry1] : gout\par 12/21 was increased to allopurinol 300, but then stopped on his own a month\par so now with gout attack, severe in first toe, will give indomethacin for acute flare, spent time, explaining need for compliance to prevent for further attacks\par \par toe pain\par reviewed notes from orthopedist and reviewed MRI with patient\par will trial medrol, and reassess\par if need, can consider surgery\par \par 22 min telephone\par \par obesity\par The diagnosis of obesity was discussed with the patient. The patient was counseled on diet and exercise. Patient was advised to eat a diet low in carbohydrates and low in fat with high protein diet with plenty of vegetables. Patient was counseled to eat small meals throughout the day avoiding carbohydrates, foods high in fat, foods that are fried and fast food.  Patient was advised to monitor fluid intake, to drink at least 8 glasses of pure water a day and reduce/stop intake of all sugar drinks including juice and soda. Patient was advised to try and exercise for 30-40 minutes per day for at least three days a week. Pros and cons of using medical management for weight loss versus diet/exercise alone was discussed. Medication options were provided for the patient and side affects and risks were identified and discussed.  Patient was advised to take any medications as prescribed and to call office or go to the ER immediately if any issues with the medications occur. All questions were answered.\par would like to restart phentermine, but decided against it due to panic attacks\par 5/11/22 wishes to restart, discussed, will fu in 4 weeks\par \par PANIC ATTACKS\par has history of severe panic attacks, out of nowhere, couple of times a week, getting nervous going through tunnels and bridges, panic attack only lasts a few minutes but feels like forever\par never used the xanax, last one was summer 2021\par \par ANXIETY/DEPRESSION\par Discussed diagnosis of anxiety and depression with the patient and potential outcomes/side affects of treatment versus non treatment. Medications were assessed and described at length. Side affects and black box warning were discussed.  Patient was advised to continue will all medications prescribed and the need for compliance was discussed and emphasized. Patient was advised to not stop medications without discussing with a health care provider first. Patient was advised to continue psychotherapy or seek therapy if not currently attending. Patient was educated on addictive potential of controlled substances and was counseled to use only as needed and sparingly. Patient verbalized understanding of all the above and all questions were answered.\par 2/22 - Gets angry easily, depressed, gets anxious about health, feels heart is fluttering\par discussed medications, not ready at this time, will do counseling\par \par heart fluttering\par seeing cardio next week - \par \par ALLERGIES\par was doing better overall with flonase and singulair\par but having some issues, will try xyzal\par \par cholesterol\par The diagnosis of high cholesterol is established and known to the patient and provider.  Previous blood work was evaluated at time of visit and reviewed with the patient. Blood work was drawn in office and results will be reviewed and followed. \par reviewed bw, was elevated\par \par ASTHMA\par doing well, had episodic issues\par \par SLEEP ISSUES\par saw pulm, but has not followed up with pulm as of yet\par

## 2022-07-27 NOTE — HISTORY OF PRESENT ILLNESS
[Home] : at home, [unfilled] , at the time of the visit. [Medical Office: ([facility])___] : at the medical office located in  [Verbal consent obtained from patient] : the patient, [unfilled] [FreeTextEntry8] : 42 year old male here with complaints of gout flare up. Patients active medications, allergies and issues were all reviewed with the patient at time of visit.\par \par depression/anxiety\par \par \par \par \par

## 2022-07-27 NOTE — HEALTH RISK ASSESSMENT
[Yes] : Yes [No falls in past year] : Patient reported no falls in the past year [0] : 2) Feeling down, depressed, or hopeless: Not at all (0) [PHQ-2 Negative - No further assessment needed] : PHQ-2 Negative - No further assessment needed [[id number]] : 0 [Very Good] : ~his/her~  mood as very good [None] : None [With Significant Other] : lives with significant other [Employed] : employed [] :  [# Of Children ___] : has [unfilled] children [Fully functional (bathing, dressing, toileting, transferring, walking, feeding)] : Fully functional (bathing, dressing, toileting, transferring, walking, feeding) [Fully functional (using the telephone, shopping, preparing meals, housekeeping, doing laundry, using] : Fully functional and needs no help or supervision to perform IADLs (using the telephone, shopping, preparing meals, housekeeping, doing laundry, using transportation, managing medications and managing finances) [Smoke Detector] : smoke detector [Seat Belt] :  uses seat belt [de-identified] : con ed

## 2022-08-04 ENCOUNTER — APPOINTMENT (OUTPATIENT)
Dept: ORTHOPEDIC SURGERY | Facility: CLINIC | Age: 42
End: 2022-08-04

## 2024-08-16 NOTE — HISTORY OF PRESENT ILLNESS
[de-identified] : 40 year old male here with complaints of testing positive for covid on 2/9/2021.still having some shortness of breath. Patients active medications, allergies and issues were all reviewed with the patient at time of visit.\par \par  4 = No assist / stand by assistance

## 2024-12-25 PROBLEM — F10.90 ALCOHOL USE: Status: ACTIVE | Noted: 2022-02-11
